# Patient Record
Sex: MALE | Race: WHITE | Employment: FULL TIME | URBAN - METROPOLITAN AREA
[De-identification: names, ages, dates, MRNs, and addresses within clinical notes are randomized per-mention and may not be internally consistent; named-entity substitution may affect disease eponyms.]

---

## 2017-04-24 ENCOUNTER — TELEPHONE (OUTPATIENT)
Dept: FAMILY MEDICINE CLINIC | Facility: CLINIC | Age: 37
End: 2017-04-24

## 2017-04-24 ENCOUNTER — HOSPITAL ENCOUNTER (OUTPATIENT)
Dept: GENERAL RADIOLOGY | Age: 37
Discharge: HOME | End: 2017-04-24
Attending: FAMILY MEDICINE
Payer: COMMERCIAL

## 2017-04-24 ENCOUNTER — OFFICE VISIT (OUTPATIENT)
Dept: FAMILY MEDICINE CLINIC | Facility: CLINIC | Age: 37
End: 2017-04-24

## 2017-04-24 VITALS
TEMPERATURE: 98 F | HEART RATE: 66 BPM | SYSTOLIC BLOOD PRESSURE: 114 MMHG | WEIGHT: 207 LBS | RESPIRATION RATE: 19 BRPM | DIASTOLIC BLOOD PRESSURE: 82 MMHG | HEIGHT: 69 IN | BODY MASS INDEX: 30.66 KG/M2

## 2017-04-24 DIAGNOSIS — M53.3 COCCYGEAL PAIN: ICD-10-CM

## 2017-04-24 DIAGNOSIS — M53.3 SACROCOCCYGEAL DISORDERS, NOT ELSEWHERE CLASSIFIED: Primary | ICD-10-CM

## 2017-04-24 DIAGNOSIS — S30.0XXD CONTUSION OF LOWER BACK AND PELVIS, SUBSEQUENT ENCOUNTER: ICD-10-CM

## 2017-04-24 DIAGNOSIS — S30.0XXA COCCYX CONTUSION, INITIAL ENCOUNTER: ICD-10-CM

## 2017-04-24 DIAGNOSIS — S30.0XXA COCCYX CONTUSION, INITIAL ENCOUNTER: Primary | ICD-10-CM

## 2017-04-24 PROCEDURE — 99214 OFFICE O/P EST MOD 30 MIN: CPT | Performed by: FAMILY MEDICINE

## 2017-04-24 PROCEDURE — 72220 X-RAY EXAM SACRUM TAILBONE: CPT

## 2017-04-24 RX ORDER — DICLOFENAC SODIUM 75 MG/1
75 TABLET, DELAYED RELEASE ORAL 2 TIMES DAILY
Qty: 30 TABLET | Refills: 0 | Status: SHIPPED | OUTPATIENT
Start: 2017-04-24 | End: 2017-10-30 | Stop reason: ALTCHOICE

## 2017-04-24 NOTE — PATIENT INSTRUCTIONS
Do x-ray of the coccyx and sacrum. Diclofenac 75 mg 1 tablet twice a day. Apply ice 15-20 minutes at a time 2  to 3 times per day for the next couple days then use heat as needed.   Rest.

## 2017-04-24 NOTE — PROGRESS NOTES
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 cc pain of the coccyx. HPI:   Patient is coming for evaluation of the pain in her his coccyx area. 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   He fell down on his coccyx area on the firm without murmur  GI: good BS's,no masses, HSM or tenderness  EXTREMITIES: no cyanosis, clubbing or edema  Musculoskeletal tenderness over the coccyx area, symmetric strength bilateral lower extremities, normal gait  Neurologic symmetric sensation bilateral Mike Ribera MD          The patient indicates understanding of these issues and agrees to the plan. The patient is asked to return in 2 weeks after X- ray.

## 2017-10-30 ENCOUNTER — OFFICE VISIT (OUTPATIENT)
Dept: FAMILY MEDICINE CLINIC | Facility: CLINIC | Age: 37
End: 2017-10-30

## 2017-10-30 VITALS
BODY MASS INDEX: 30.81 KG/M2 | SYSTOLIC BLOOD PRESSURE: 116 MMHG | TEMPERATURE: 98 F | WEIGHT: 208 LBS | HEART RATE: 67 BPM | DIASTOLIC BLOOD PRESSURE: 70 MMHG | HEIGHT: 69 IN | RESPIRATION RATE: 16 BRPM

## 2017-10-30 DIAGNOSIS — Z00.00 LABORATORY TESTS ORDERED AS PART OF A COMPLETE PHYSICAL EXAM (CPE): ICD-10-CM

## 2017-10-30 DIAGNOSIS — E55.9 VITAMIN D DEFICIENCY: ICD-10-CM

## 2017-10-30 DIAGNOSIS — H10.13 ALLERGIC CONJUNCTIVITIS OF BOTH EYES: Primary | ICD-10-CM

## 2017-10-30 DIAGNOSIS — R09.81 NASAL CONGESTION: ICD-10-CM

## 2017-10-30 DIAGNOSIS — J30.89 ACUTE NON-SEASONAL ALLERGIC RHINITIS, UNSPECIFIED TRIGGER: ICD-10-CM

## 2017-10-30 PROCEDURE — 99214 OFFICE O/P EST MOD 30 MIN: CPT | Performed by: FAMILY MEDICINE

## 2017-10-30 RX ORDER — FLUTICASONE PROPIONATE 50 MCG
2 SPRAY, SUSPENSION (ML) NASAL DAILY
Qty: 1 BOTTLE | Refills: 1 | Status: SHIPPED | OUTPATIENT
Start: 2017-10-30 | End: 2018-10-24 | Stop reason: ALTCHOICE

## 2017-10-30 NOTE — PROGRESS NOTES
Cyndy Alejandro is a 40year old male. cc itchy eyes, nasal congestion vitamin D deficiency. ,   HPI:   Patients come to the office complaining of having itchy eyes which started probably 3 months ago it is coming on and off.   They are watery itchy feels that BMI 30.72 kg/m²   GENERAL: well developed, well nourished,in no apparent distress  SKIN: no rashes,no suspicious lesions  HEENT: atraumatic, normocephalic,ears -minimal fluid behind tympanic members, irritation of the posterior throat, irritation of the na

## 2017-10-30 NOTE — PATIENT INSTRUCTIONS
Start Zyrtec 10 mg 1 tablet daily at bedtime. Fluticasone 2 sprays nostril once a day. Zaditor over-the-counter 1 drop twice a day both eyes.   You can check with her insurance if they cover blood allergy testing for allergic conjunctivitis diagnosis and

## 2018-10-24 ENCOUNTER — OFFICE VISIT (OUTPATIENT)
Dept: FAMILY MEDICINE CLINIC | Facility: CLINIC | Age: 38
End: 2018-10-24
Payer: COMMERCIAL

## 2018-10-24 ENCOUNTER — LAB ENCOUNTER (OUTPATIENT)
Dept: LAB | Age: 38
End: 2018-10-24
Attending: FAMILY MEDICINE
Payer: COMMERCIAL

## 2018-10-24 VITALS
DIASTOLIC BLOOD PRESSURE: 74 MMHG | BODY MASS INDEX: 30.07 KG/M2 | HEIGHT: 69 IN | SYSTOLIC BLOOD PRESSURE: 112 MMHG | TEMPERATURE: 98 F | WEIGHT: 203 LBS | HEART RATE: 89 BPM | RESPIRATION RATE: 18 BRPM

## 2018-10-24 DIAGNOSIS — R79.89 ELEVATED LIVER FUNCTION TESTS: ICD-10-CM

## 2018-10-24 DIAGNOSIS — R19.7 DIARRHEA OF PRESUMED INFECTIOUS ORIGIN: Primary | ICD-10-CM

## 2018-10-24 DIAGNOSIS — R10.9 ABDOMINAL DISCOMFORT: ICD-10-CM

## 2018-10-24 DIAGNOSIS — R19.7 DIARRHEA OF PRESUMED INFECTIOUS ORIGIN: ICD-10-CM

## 2018-10-24 PROCEDURE — 36415 COLL VENOUS BLD VENIPUNCTURE: CPT | Performed by: FAMILY MEDICINE

## 2018-10-24 PROCEDURE — 80053 COMPREHEN METABOLIC PANEL: CPT | Performed by: FAMILY MEDICINE

## 2018-10-24 PROCEDURE — 99214 OFFICE O/P EST MOD 30 MIN: CPT | Performed by: FAMILY MEDICINE

## 2018-10-24 PROCEDURE — 85025 COMPLETE CBC W/AUTO DIFF WBC: CPT | Performed by: FAMILY MEDICINE

## 2018-10-24 PROCEDURE — 83690 ASSAY OF LIPASE: CPT | Performed by: FAMILY MEDICINE

## 2018-10-24 NOTE — PATIENT INSTRUCTIONS
BRAT diet- banana, rice, apple sauce, toast.   Collect stool samples first.   Charcoal pills over the counter 2-3 times a day. Peptobismol twice a day. Getorade, sprite - drink plenty of fluids. Take Probiotic daily.

## 2018-10-24 NOTE — PROGRESS NOTES
Natasha Montgomery is a 45year old male. cc diarrhea/abdominal discomfort  HPI:   Pt is coming to the office with complaint of having diarrhea since October 15. Patient was in [facility] prior start of his symptoms.   He says that one time he had a fish which was well developed, well nourished,in no apparent distress  SKIN: no rashes,no suspicious lesions  HEENT: atraumatic, normocephalic,ears and throat are clear  NECK: supple,no adenopathy,  LUNGS: clear to auscultation  CARDIO: RRR without murmur  GI: good BS's,

## 2018-10-25 ENCOUNTER — LAB ENCOUNTER (OUTPATIENT)
Dept: LAB | Age: 38
End: 2018-10-25
Attending: FAMILY MEDICINE
Payer: COMMERCIAL

## 2018-10-25 DIAGNOSIS — R19.7 DIARRHEA OF PRESUMED INFECTIOUS ORIGIN: ICD-10-CM

## 2018-10-25 DIAGNOSIS — R79.89 ELEVATED LIVER FUNCTION TESTS: Primary | ICD-10-CM

## 2018-10-25 PROCEDURE — 87046 STOOL CULTR AEROBIC BACT EA: CPT | Performed by: FAMILY MEDICINE

## 2018-10-25 PROCEDURE — 87272 CRYPTOSPORIDIUM AG IF: CPT | Performed by: FAMILY MEDICINE

## 2018-10-25 PROCEDURE — 87493 C DIFF AMPLIFIED PROBE: CPT | Performed by: FAMILY MEDICINE

## 2018-10-25 PROCEDURE — 87177 OVA AND PARASITES SMEARS: CPT | Performed by: FAMILY MEDICINE

## 2018-10-25 PROCEDURE — 87427 SHIGA-LIKE TOXIN AG IA: CPT | Performed by: FAMILY MEDICINE

## 2018-10-25 PROCEDURE — 87209 SMEAR COMPLEX STAIN: CPT | Performed by: FAMILY MEDICINE

## 2018-10-25 PROCEDURE — 87329 GIARDIA AG IA: CPT | Performed by: FAMILY MEDICINE

## 2018-10-25 PROCEDURE — 87045 FECES CULTURE AEROBIC BACT: CPT | Performed by: FAMILY MEDICINE

## 2018-10-28 ENCOUNTER — TELEPHONE (OUTPATIENT)
Dept: FAMILY MEDICINE CLINIC | Facility: CLINIC | Age: 38
End: 2018-10-28

## 2018-10-28 NOTE — TELEPHONE ENCOUNTER
Patient had elevated liver function tests on the testing I wanted to add hepatitis panel and but I do not see the results please check with the lab is not done,  thank you

## 2018-10-29 NOTE — TELEPHONE ENCOUNTER
JULIA  I spoke with patient, advised to have additional lab done, voiced understanding, agreed with plan. He will have done tomorrow, per patient.   States he is feeling better no abdominal pain, no diarrhea o

## 2018-10-29 NOTE — TELEPHONE ENCOUNTER
I spoke with Gianni(Lab), they did not add the lab on 10/25/2018, and they are tunable to add lab now.

## 2018-10-31 ENCOUNTER — APPOINTMENT (OUTPATIENT)
Dept: LAB | Age: 38
End: 2018-10-31
Attending: FAMILY MEDICINE
Payer: COMMERCIAL

## 2018-10-31 DIAGNOSIS — R79.9 ABNORMAL BLOOD CHEMISTRY: ICD-10-CM

## 2018-10-31 DIAGNOSIS — Z13.89 SCREENING FOR GENITOURINARY CONDITION: ICD-10-CM

## 2018-10-31 DIAGNOSIS — R79.89 ELEVATED LIVER FUNCTION TESTS: ICD-10-CM

## 2018-10-31 DIAGNOSIS — Z00.00 LABORATORY EXAMINATION ORDERED AS PART OF A ROUTINE GENERAL MEDICAL EXAMINATION: ICD-10-CM

## 2018-10-31 DIAGNOSIS — Z00.00 LABORATORY EXAMINATION ORDERED AS PART OF A ROUTINE GENERAL MEDICAL EXAMINATION: Primary | ICD-10-CM

## 2018-10-31 PROCEDURE — 84443 ASSAY THYROID STIM HORMONE: CPT | Performed by: FAMILY MEDICINE

## 2018-10-31 PROCEDURE — 80074 ACUTE HEPATITIS PANEL: CPT | Performed by: FAMILY MEDICINE

## 2018-10-31 PROCEDURE — 36415 COLL VENOUS BLD VENIPUNCTURE: CPT | Performed by: FAMILY MEDICINE

## 2018-10-31 PROCEDURE — 81003 URINALYSIS AUTO W/O SCOPE: CPT | Performed by: FAMILY MEDICINE

## 2018-10-31 PROCEDURE — 80061 LIPID PANEL: CPT | Performed by: FAMILY MEDICINE

## 2018-10-31 PROCEDURE — 80053 COMPREHEN METABOLIC PANEL: CPT | Performed by: FAMILY MEDICINE

## 2019-02-08 ENCOUNTER — OFFICE VISIT (OUTPATIENT)
Dept: FAMILY MEDICINE CLINIC | Facility: CLINIC | Age: 39
End: 2019-02-08

## 2019-02-08 VITALS
HEIGHT: 69 IN | BODY MASS INDEX: 30.21 KG/M2 | DIASTOLIC BLOOD PRESSURE: 72 MMHG | HEART RATE: 56 BPM | SYSTOLIC BLOOD PRESSURE: 108 MMHG | WEIGHT: 204 LBS | RESPIRATION RATE: 16 BRPM | TEMPERATURE: 97 F

## 2019-02-08 DIAGNOSIS — Z00.00 PHYSICAL EXAM, ANNUAL: Primary | ICD-10-CM

## 2019-02-08 DIAGNOSIS — E55.9 VITAMIN D DEFICIENCY: ICD-10-CM

## 2019-02-08 DIAGNOSIS — E78.2 HYPERLIPIDEMIA, MIXED: ICD-10-CM

## 2019-02-08 PROCEDURE — 99395 PREV VISIT EST AGE 18-39: CPT | Performed by: FAMILY MEDICINE

## 2019-02-09 NOTE — PATIENT INSTRUCTIONS
Healthy diet. Stay active. Fasting blood test in April 2019 we will call you with results when those are back.

## 2019-02-09 NOTE — PROGRESS NOTES
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   HPI:   Pt no complaints. Cholesterol is borderline elevated at 1 of the liver test came back borderline. Patient will recheck blood work in the spring. Has new baby.   Kvng congestion, sinus pain or ST  LUNGS: denies shortness of breath with exertion  CARDIOVASCULAR: denies chest pain on exertion  GI: denies abdominal pain,denies heartburn  : denies nocturia or changes in stream  MUSCULOSKELETAL: denies back pain  NEURO: de diet and aerobic exercise 30 minutes three times weekly. Health maintenance, will check fasting Lipids, CMP, CBC . Pt will be at 48 referred for screening colonoscopy. The patient indicates understanding of these issues and agrees to the plan.   The patient

## 2020-01-02 ENCOUNTER — HOSPITAL ENCOUNTER (EMERGENCY)
Age: 40
Discharge: HOME | End: 2020-01-02
Attending: EMERGENCY MEDICINE
Payer: COMMERCIAL

## 2020-01-02 ENCOUNTER — TELEPHONE (OUTPATIENT)
Dept: FAMILY MEDICINE CLINIC | Facility: CLINIC | Age: 40
End: 2020-01-02

## 2020-01-02 ENCOUNTER — APPOINTMENT (OUTPATIENT)
Dept: GENERAL RADIOLOGY | Age: 40
End: 2020-01-02
Attending: EMERGENCY MEDICINE
Payer: COMMERCIAL

## 2020-01-02 VITALS
BODY MASS INDEX: 30.62 KG/M2 | SYSTOLIC BLOOD PRESSURE: 132 MMHG | DIASTOLIC BLOOD PRESSURE: 62 MMHG | HEART RATE: 70 BPM | OXYGEN SATURATION: 99 % | RESPIRATION RATE: 16 BRPM | WEIGHT: 202 LBS | TEMPERATURE: 98 F | HEIGHT: 68 IN

## 2020-01-02 DIAGNOSIS — R07.89 CHEST PAIN, ATYPICAL: Primary | ICD-10-CM

## 2020-01-02 LAB
ALBUMIN SERPL-MCNC: 4 G/DL (ref 3.4–5)
ALBUMIN/GLOB SERPL: 1 {RATIO} (ref 1–2)
ALP LIVER SERPL-CCNC: 84 U/L (ref 45–117)
ALT SERPL-CCNC: 55 U/L (ref 16–61)
ANION GAP SERPL CALC-SCNC: 7 MMOL/L (ref 0–18)
AST SERPL-CCNC: 23 U/L (ref 15–37)
BASOPHILS # BLD AUTO: 0.03 X10(3) UL (ref 0–0.2)
BASOPHILS NFR BLD AUTO: 0.4 %
BILIRUB SERPL-MCNC: 0.2 MG/DL (ref 0.1–2)
BUN BLD-MCNC: 24 MG/DL (ref 7–18)
BUN/CREAT SERPL: 21.2 (ref 10–20)
CALCIUM BLD-MCNC: 8.6 MG/DL (ref 8.5–10.1)
CHLORIDE SERPL-SCNC: 110 MMOL/L (ref 98–112)
CO2 SERPL-SCNC: 24 MMOL/L (ref 21–32)
CREAT BLD-MCNC: 1.13 MG/DL (ref 0.7–1.3)
DEPRECATED RDW RBC AUTO: 39.8 FL (ref 35.1–46.3)
EOSINOPHIL # BLD AUTO: 0.28 X10(3) UL (ref 0–0.7)
EOSINOPHIL NFR BLD AUTO: 3.4 %
ERYTHROCYTE [DISTWIDTH] IN BLOOD BY AUTOMATED COUNT: 12.9 % (ref 11–15)
GLOBULIN PLAS-MCNC: 3.9 G/DL (ref 2.8–4.4)
GLUCOSE BLD-MCNC: 107 MG/DL (ref 70–99)
HCT VFR BLD AUTO: 43.1 % (ref 39–53)
HGB BLD-MCNC: 14.6 G/DL (ref 13–17.5)
IMM GRANULOCYTES # BLD AUTO: 0.02 X10(3) UL (ref 0–1)
IMM GRANULOCYTES NFR BLD: 0.2 %
LYMPHOCYTES # BLD AUTO: 3.56 X10(3) UL (ref 1–4)
LYMPHOCYTES NFR BLD AUTO: 43 %
M PROTEIN MFR SERPL ELPH: 7.9 G/DL (ref 6.4–8.2)
MCH RBC QN AUTO: 28.5 PG (ref 26–34)
MCHC RBC AUTO-ENTMCNC: 33.9 G/DL (ref 31–37)
MCV RBC AUTO: 84.2 FL (ref 80–100)
MONOCYTES # BLD AUTO: 0.69 X10(3) UL (ref 0.1–1)
MONOCYTES NFR BLD AUTO: 8.3 %
NEUTROPHILS # BLD AUTO: 3.7 X10 (3) UL (ref 1.5–7.7)
NEUTROPHILS # BLD AUTO: 3.7 X10(3) UL (ref 1.5–7.7)
NEUTROPHILS NFR BLD AUTO: 44.7 %
OSMOLALITY SERPL CALC.SUM OF ELEC: 297 MOSM/KG (ref 275–295)
PLATELET # BLD AUTO: 259 10(3)UL (ref 150–450)
POTASSIUM SERPL-SCNC: 4 MMOL/L (ref 3.5–5.1)
RBC # BLD AUTO: 5.12 X10(6)UL (ref 4.3–5.7)
SODIUM SERPL-SCNC: 141 MMOL/L (ref 136–145)
TROPONIN I SERPL-MCNC: <0.045 NG/ML (ref ?–0.04)
WBC # BLD AUTO: 8.3 X10(3) UL (ref 4–11)

## 2020-01-02 PROCEDURE — 84484 ASSAY OF TROPONIN QUANT: CPT | Performed by: EMERGENCY MEDICINE

## 2020-01-02 PROCEDURE — 36415 COLL VENOUS BLD VENIPUNCTURE: CPT

## 2020-01-02 PROCEDURE — 93005 ELECTROCARDIOGRAM TRACING: CPT

## 2020-01-02 PROCEDURE — 99284 EMERGENCY DEPT VISIT MOD MDM: CPT

## 2020-01-02 PROCEDURE — 85025 COMPLETE CBC W/AUTO DIFF WBC: CPT | Performed by: EMERGENCY MEDICINE

## 2020-01-02 PROCEDURE — 99285 EMERGENCY DEPT VISIT HI MDM: CPT

## 2020-01-02 PROCEDURE — 93010 ELECTROCARDIOGRAM REPORT: CPT

## 2020-01-02 PROCEDURE — 71045 X-RAY EXAM CHEST 1 VIEW: CPT | Performed by: EMERGENCY MEDICINE

## 2020-01-02 PROCEDURE — 80053 COMPREHEN METABOLIC PANEL: CPT | Performed by: EMERGENCY MEDICINE

## 2020-01-02 NOTE — TELEPHONE ENCOUNTER
Pt called back requesting a CPE for PCP next week, \"I want to talk to my doctor\" pt transferred to triage and states he did not go to ER, that he does not have chest pain now and wants an appt next week with Dr Justino Osorio. pt states if he has chest pain

## 2020-01-02 NOTE — TELEPHONE ENCOUNTER
My recommendation is for patient to be evaluated in the emergency room if he is having intermittent chest pain. He should understand that this is a serious symptom and should be evaluated ASAP.   We do not have capabilities of running the proper blood test

## 2020-01-02 NOTE — TELEPHONE ENCOUNTER
1. What are your symptoms? Left sided chest pain      2. How long have you been having these symptoms? Had it once while driving      3. Have you done anything already to treat your symptoms?    Nothing      ADDITIONAL INFO:     Transferred live to triage

## 2020-01-02 NOTE — TELEPHONE ENCOUNTER
Strongly encourage pt to go to ER for his symptoms. Explaining the importance of being evaluated. Voices understanding.

## 2020-01-03 ENCOUNTER — TELEPHONE (OUTPATIENT)
Dept: FAMILY MEDICINE CLINIC | Facility: CLINIC | Age: 40
End: 2020-01-03

## 2020-01-03 LAB
ATRIAL RATE: 67 BPM
P AXIS: 25 DEGREES
P-R INTERVAL: 204 MS
Q-T INTERVAL: 380 MS
QRS DURATION: 98 MS
QTC CALCULATION (BEZET): 401 MS
R AXIS: -20 DEGREES
T AXIS: 7 DEGREES
VENTRICULAR RATE: 67 BPM

## 2020-01-03 NOTE — ED PROVIDER NOTES
Patient Seen in: [facility] In [place]      History   Patient presents with:  Chest Pain Angina    Stated Complaint: chest pain, left arm numbness    HPI    27-year-old male comes to the hospital complaint of having difficulty with ch JVD, trachea midline, No LAD  Heart: S1S2 normal. No murmurs, regular rate and rhythm  Lungs: Clear to auscultation bilaterally  Abdomen: Soft nontender nondistended normal active bowel sounds without rebound, guarding or masses noted  Back nontender witho normal in size. The lungs are clear of acute-appearing disease process. The costophrenic angles are sharp. There is no active disease seen on the basis of portable chest radiography. CONCLUSION:  No active disease seen.     Dictated by: Argentina Lipscomb

## 2020-01-15 ENCOUNTER — OFFICE VISIT (OUTPATIENT)
Dept: FAMILY MEDICINE CLINIC | Facility: CLINIC | Age: 40
End: 2020-01-15
Payer: COMMERCIAL

## 2020-01-15 VITALS
TEMPERATURE: 97 F | HEIGHT: 68 IN | HEART RATE: 68 BPM | SYSTOLIC BLOOD PRESSURE: 120 MMHG | BODY MASS INDEX: 31.64 KG/M2 | WEIGHT: 208.81 LBS | DIASTOLIC BLOOD PRESSURE: 70 MMHG

## 2020-01-15 DIAGNOSIS — Z87.891 FORMER SMOKER: ICD-10-CM

## 2020-01-15 DIAGNOSIS — R07.9 CHEST PAIN, UNSPECIFIED TYPE: Primary | ICD-10-CM

## 2020-01-15 DIAGNOSIS — R94.31 ABNORMAL FINDING ON EKG: ICD-10-CM

## 2020-01-15 DIAGNOSIS — E78.00 HYPERCHOLESTEREMIA: ICD-10-CM

## 2020-01-15 DIAGNOSIS — Z00.00 LABORATORY TESTS ORDERED AS PART OF A COMPLETE PHYSICAL EXAM (CPE): ICD-10-CM

## 2020-01-15 PROCEDURE — 99214 OFFICE O/P EST MOD 30 MIN: CPT | Performed by: FAMILY MEDICINE

## 2020-01-15 NOTE — PATIENT INSTRUCTIONS
Call [phone] to schedule  echo of the heart. Healthy diet. Keep good hydration. Schedule complete physical and do fasting blood work 1 week prior your physical visit.

## 2020-01-16 NOTE — PROGRESS NOTES
Sidney Fowler is a 44year old male. cc chest pain, elevated cholesterol in the past  HPI:   Patient is coming to the office for follow-up from emergency room visit from January 2.   Patient noted to have episodes of chest pain on the left side of the chest 120/70   Pulse 68   Temp 96.9 °F (36.1 °C) (Oral)   Ht 68\"   Wt 208 lb 12.8 oz (94.7 kg)   BMI 31.75 kg/m²   GENERAL: well developed, well nourished,in no apparent distress  SKIN: no rashes,no suspicious lesions  HEENT: atraumatic, normocephalic,ears and

## 2020-03-07 ENCOUNTER — LAB ENCOUNTER (OUTPATIENT)
Dept: LAB | Facility: HOSPITAL | Age: 40
End: 2020-03-07
Attending: FAMILY MEDICINE
Payer: COMMERCIAL

## 2020-03-07 DIAGNOSIS — Z00.00 LABORATORY TESTS ORDERED AS PART OF A COMPLETE PHYSICAL EXAM (CPE): ICD-10-CM

## 2020-03-07 DIAGNOSIS — E78.00 HYPERCHOLESTEREMIA: Primary | ICD-10-CM

## 2020-03-07 LAB
ALBUMIN SERPL-MCNC: 4 G/DL (ref 3.4–5)
ALBUMIN/GLOB SERPL: 1 {RATIO} (ref 1–2)
ALP LIVER SERPL-CCNC: 59 U/L (ref 45–117)
ALT SERPL-CCNC: 48 U/L (ref 16–61)
ANION GAP SERPL CALC-SCNC: 4 MMOL/L (ref 0–18)
AST SERPL-CCNC: 22 U/L (ref 15–37)
BASOPHILS # BLD AUTO: 0.03 X10(3) UL (ref 0–0.2)
BASOPHILS NFR BLD AUTO: 0.5 %
BILIRUB SERPL-MCNC: 0.5 MG/DL (ref 0.1–2)
BILIRUB UR QL STRIP.AUTO: NEGATIVE
BUN BLD-MCNC: 20 MG/DL (ref 7–18)
BUN/CREAT SERPL: 22.2 (ref 10–20)
CALCIUM BLD-MCNC: 9 MG/DL (ref 8.5–10.1)
CHLORIDE SERPL-SCNC: 108 MMOL/L (ref 98–112)
CHOLEST SMN-MCNC: 227 MG/DL (ref ?–200)
CLARITY UR REFRACT.AUTO: CLEAR
CO2 SERPL-SCNC: 28 MMOL/L (ref 21–32)
COLOR UR AUTO: YELLOW
CREAT BLD-MCNC: 0.9 MG/DL (ref 0.7–1.3)
DEPRECATED RDW RBC AUTO: 40.7 FL (ref 35.1–46.3)
EOSINOPHIL # BLD AUTO: 0.27 X10(3) UL (ref 0–0.7)
EOSINOPHIL NFR BLD AUTO: 4.1 %
ERYTHROCYTE [DISTWIDTH] IN BLOOD BY AUTOMATED COUNT: 12.7 % (ref 11–15)
GLOBULIN PLAS-MCNC: 3.9 G/DL (ref 2.8–4.4)
GLUCOSE BLD-MCNC: 97 MG/DL (ref 70–99)
GLUCOSE UR STRIP.AUTO-MCNC: NEGATIVE MG/DL
HCT VFR BLD AUTO: 46.4 % (ref 39–53)
HDLC SERPL-MCNC: 38 MG/DL (ref 40–59)
HGB BLD-MCNC: 15 G/DL (ref 13–17.5)
IMM GRANULOCYTES # BLD AUTO: 0.02 X10(3) UL (ref 0–1)
IMM GRANULOCYTES NFR BLD: 0.3 %
KETONES UR STRIP.AUTO-MCNC: NEGATIVE MG/DL
LDLC SERPL CALC-MCNC: 154 MG/DL (ref ?–100)
LEUKOCYTE ESTERASE UR QL STRIP.AUTO: NEGATIVE
LYMPHOCYTES # BLD AUTO: 2.83 X10(3) UL (ref 1–4)
LYMPHOCYTES NFR BLD AUTO: 42.7 %
M PROTEIN MFR SERPL ELPH: 7.9 G/DL (ref 6.4–8.2)
MCH RBC QN AUTO: 28.2 PG (ref 26–34)
MCHC RBC AUTO-ENTMCNC: 32.3 G/DL (ref 31–37)
MCV RBC AUTO: 87.4 FL (ref 80–100)
MONOCYTES # BLD AUTO: 0.56 X10(3) UL (ref 0.1–1)
MONOCYTES NFR BLD AUTO: 8.4 %
NEUTROPHILS # BLD AUTO: 2.92 X10 (3) UL (ref 1.5–7.7)
NEUTROPHILS # BLD AUTO: 2.92 X10(3) UL (ref 1.5–7.7)
NEUTROPHILS NFR BLD AUTO: 44 %
NITRITE UR QL STRIP.AUTO: NEGATIVE
NONHDLC SERPL-MCNC: 189 MG/DL (ref ?–130)
OSMOLALITY SERPL CALC.SUM OF ELEC: 293 MOSM/KG (ref 275–295)
PATIENT FASTING Y/N/NP: YES
PATIENT FASTING Y/N/NP: YES
PH UR STRIP.AUTO: 6 [PH] (ref 4.5–8)
PLATELET # BLD AUTO: 232 10(3)UL (ref 150–450)
POTASSIUM SERPL-SCNC: 4.2 MMOL/L (ref 3.5–5.1)
PROT UR STRIP.AUTO-MCNC: NEGATIVE MG/DL
RBC # BLD AUTO: 5.31 X10(6)UL (ref 4.3–5.7)
RBC UR QL AUTO: NEGATIVE
SODIUM SERPL-SCNC: 140 MMOL/L (ref 136–145)
SP GR UR STRIP.AUTO: 1.02 (ref 1–1.03)
TRIGL SERPL-MCNC: 177 MG/DL (ref 30–149)
TSI SER-ACNC: 1.42 MIU/ML (ref 0.36–3.74)
UROBILINOGEN UR STRIP.AUTO-MCNC: <2 MG/DL
VLDLC SERPL CALC-MCNC: 35 MG/DL (ref 0–30)
WBC # BLD AUTO: 6.6 X10(3) UL (ref 4–11)

## 2020-03-07 PROCEDURE — 36415 COLL VENOUS BLD VENIPUNCTURE: CPT

## 2020-03-07 PROCEDURE — 80053 COMPREHEN METABOLIC PANEL: CPT

## 2020-03-07 PROCEDURE — 80061 LIPID PANEL: CPT

## 2020-03-07 PROCEDURE — 85025 COMPLETE CBC W/AUTO DIFF WBC: CPT

## 2020-03-07 PROCEDURE — 84443 ASSAY THYROID STIM HORMONE: CPT

## 2020-03-07 PROCEDURE — 81003 URINALYSIS AUTO W/O SCOPE: CPT

## 2020-05-18 ENCOUNTER — TELEPHONE (OUTPATIENT)
Dept: FAMILY MEDICINE CLINIC | Facility: CLINIC | Age: 40
End: 2020-05-18

## 2021-05-21 ENCOUNTER — TELEPHONE (OUTPATIENT)
Dept: FAMILY MEDICINE CLINIC | Facility: CLINIC | Age: 41
End: 2021-05-21

## 2021-05-21 ENCOUNTER — HOSPITAL ENCOUNTER (OUTPATIENT)
Age: 41
Discharge: HOME | End: 2021-05-21
Payer: COMMERCIAL

## 2021-05-21 VITALS
HEART RATE: 63 BPM | TEMPERATURE: 98 F | OXYGEN SATURATION: 95 % | DIASTOLIC BLOOD PRESSURE: 71 MMHG | RESPIRATION RATE: 16 BRPM | SYSTOLIC BLOOD PRESSURE: 119 MMHG

## 2021-05-21 DIAGNOSIS — S05.02XA ABRASION OF LEFT CORNEA, INITIAL ENCOUNTER: Primary | ICD-10-CM

## 2021-05-21 PROCEDURE — 99213 OFFICE O/P EST LOW 20 MIN: CPT

## 2021-05-21 RX ORDER — OFLOXACIN 3 MG/ML
1 SOLUTION/ DROPS OPHTHALMIC 4 TIMES DAILY
Qty: 1 BOTTLE | Refills: 0 | Status: SHIPPED | OUTPATIENT
Start: 2021-05-21 | End: 2021-05-31

## 2021-05-21 RX ORDER — TETRACAINE HYDROCHLORIDE 5 MG/ML
1 SOLUTION OPHTHALMIC ONCE
Status: COMPLETED | OUTPATIENT
Start: 2021-05-21 | End: 2021-05-21

## 2021-05-21 NOTE — ED PROVIDER NOTES
Patient Seen in: [facility]      History   Patient presents with:  Eye Problem    Stated Complaint: eye irritation    HPI/Subjective:   HPI    35 yo male here with complaint of redness and discomfort to the left eye in tandem  with foreig Normocephalic.       Right Ear: External ear normal.      Left Ear: External ear normal.      Nose: Nose normal.      Mouth/Throat:      Mouth: Mucous membranes are moist.   Eyes:      General: Lids are normal.      Extraocular Movements: Extraocular moveme discharge today. Previous conversations with PCP and charts were reviewed.                                 Disposition and Plan     Clinical Impression:  Abrasion of left cornea, initial encounter  (primary encounter diagnosis)     Disposition:  Discharge

## 2021-05-21 NOTE — TELEPHONE ENCOUNTER
Patient left eye is swollen and he cant see out of it. Patient said there was a bug his eye yesterday  and his wife removed it. Its now really swollen and he feels like there is something in it. Please call wife  back.

## 2021-05-21 NOTE — ED INITIAL ASSESSMENT (HPI)
Patient presents to IC with c/o left eye irritation x 2 days. Stoney Conteh flew out of nest yesterday and some pami flew into it. NO contact lens. Tdap up to date.

## 2022-05-18 ENCOUNTER — OFFICE VISIT (OUTPATIENT)
Dept: FAMILY MEDICINE CLINIC | Facility: CLINIC | Age: 42
End: 2022-05-18
Payer: COMMERCIAL

## 2022-05-18 VITALS
HEART RATE: 70 BPM | HEIGHT: 68 IN | BODY MASS INDEX: 31.98 KG/M2 | TEMPERATURE: 97 F | DIASTOLIC BLOOD PRESSURE: 74 MMHG | SYSTOLIC BLOOD PRESSURE: 102 MMHG | WEIGHT: 211 LBS | RESPIRATION RATE: 14 BRPM

## 2022-05-18 DIAGNOSIS — Z12.5 SCREENING FOR MALIGNANT NEOPLASM OF PROSTATE: ICD-10-CM

## 2022-05-18 DIAGNOSIS — Z13.89 SCREENING FOR GENITOURINARY CONDITION: ICD-10-CM

## 2022-05-18 DIAGNOSIS — Z00.00 LABORATORY EXAMINATION ORDERED AS PART OF A ROUTINE GENERAL MEDICAL EXAMINATION: ICD-10-CM

## 2022-05-18 DIAGNOSIS — Z00.00 PHYSICAL EXAM, ANNUAL: Primary | ICD-10-CM

## 2022-05-18 PROCEDURE — 3078F DIAST BP <80 MM HG: CPT | Performed by: FAMILY MEDICINE

## 2022-05-18 PROCEDURE — 3074F SYST BP LT 130 MM HG: CPT | Performed by: FAMILY MEDICINE

## 2022-05-18 PROCEDURE — 3008F BODY MASS INDEX DOCD: CPT | Performed by: FAMILY MEDICINE

## 2022-05-18 PROCEDURE — 99396 PREV VISIT EST AGE 40-64: CPT | Performed by: FAMILY MEDICINE

## 2023-01-18 ENCOUNTER — HOSPITAL ENCOUNTER (OUTPATIENT)
Age: 43
Discharge: HOME | End: 2023-01-18
Payer: COMMERCIAL

## 2023-01-18 VITALS
OXYGEN SATURATION: 97 % | RESPIRATION RATE: 16 BRPM | DIASTOLIC BLOOD PRESSURE: 85 MMHG | HEIGHT: 66 IN | BODY MASS INDEX: 33.75 KG/M2 | HEART RATE: 66 BPM | TEMPERATURE: 98 F | SYSTOLIC BLOOD PRESSURE: 132 MMHG | WEIGHT: 210 LBS

## 2023-01-18 DIAGNOSIS — R07.89 CHEST PAIN, ATYPICAL: Primary | ICD-10-CM

## 2023-01-18 DIAGNOSIS — H10.31 ACUTE CONJUNCTIVITIS OF RIGHT EYE, UNSPECIFIED ACUTE CONJUNCTIVITIS TYPE: ICD-10-CM

## 2023-01-18 PROCEDURE — 93005 ELECTROCARDIOGRAM TRACING: CPT

## 2023-01-18 PROCEDURE — 93010 ELECTROCARDIOGRAM REPORT: CPT

## 2023-01-18 PROCEDURE — 99214 OFFICE O/P EST MOD 30 MIN: CPT

## 2023-01-18 RX ORDER — POLYMYXIN B SULFATE AND TRIMETHOPRIM 1; 10000 MG/ML; [USP'U]/ML
1 SOLUTION OPHTHALMIC
Qty: 10 ML | Refills: 0 | Status: SHIPPED | OUTPATIENT
Start: 2023-01-18 | End: 2023-01-23 | Stop reason: ALTCHOICE

## 2023-01-19 ENCOUNTER — TELEPHONE (OUTPATIENT)
Dept: FAMILY MEDICINE CLINIC | Facility: CLINIC | Age: 43
End: 2023-01-19

## 2023-01-19 LAB
ATRIAL RATE: 71 BPM
P AXIS: 23 DEGREES
P-R INTERVAL: 206 MS
Q-T INTERVAL: 376 MS
QRS DURATION: 98 MS
QTC CALCULATION (BEZET): 408 MS
R AXIS: -17 DEGREES
T AXIS: 7 DEGREES
VENTRICULAR RATE: 71 BPM

## 2023-01-19 NOTE — TELEPHONE ENCOUNTER
FYI Only:    S/w Chris who denied need for "[employer]. Hx of sx: Pt was seen at [address] [place] [address] for intermittent left chest pain 1/17/23 and blood in eye. Pt denied known cardiac history. Sx: Pt stated 5/10 left side of chest and has not worsening pain since seen in . Pt denied shortness of breath or dizziness. Pt declined to schedule with [facility] since wanted to see Dr. Eric Reina first. Pt had frequent episodes of chest pain over the past few days. Consulted with Baby Emperor MARTINEZ on call who strongly advised to call [facility] to schedule an appointment as directed. Informed the pt to call tomorrow [facility] to schedule an appointment and not to do so would be against medical advise and could result in negative consequences regarding his health. Pt verbalized understanding and agreed to call. Emphasized to the pt if has increase in chest pain or develop shortness of breath, or dizziness proceed to the ED. Pt voiced understanding.

## 2023-01-19 NOTE — TELEPHONE ENCOUNTER
Gabino Callahan  # [id number]    Outbound call via [address] general message left on unidentified voice mail to call Dr. Fernando Bran office.

## 2023-01-19 NOTE — TELEPHONE ENCOUNTER
1. What are your symptoms? Pt calling stating that he was recently seen in the urgent care for chest pain, and blood in his eye. 2. How long have you been having these symptoms? Lasting for two days       3. Have you done anything already to treat your symptoms? Pt was seen in urgent care       ADDITIONAL INFO:   Pt would like to be seen for a follow up on this matter sooner than later. Pt requesting call back for this matter.

## 2023-01-20 ENCOUNTER — TELEPHONE (OUTPATIENT)
Dept: FAMILY MEDICINE CLINIC | Facility: CLINIC | Age: 43
End: 2023-01-20

## 2023-01-20 NOTE — TELEPHONE ENCOUNTER
Pt stated \" the chest pain is better the past few days. \" he is seeing cardiology but not until 02/02/2023. I offered an appt for pt to see Dr. Katy Doyle on Monday at 11:00 am. I instructed pt to not cancel the appointment on 02/02/2023 w/ cardiology. I also instructed him if he starts to have chest pain again, shortness of breath, dizziness, left arm pain, sweating , neck/ jaw pain to go to the ER immediately.  Pt. Agreed to plan and verbalized understanding

## 2023-01-20 NOTE — TELEPHONE ENCOUNTER
Patient has appointment scheduled Monday, January 23. Ivonne DUNN spoke to the patient. If symptoms would worsen recommended to go to the ER over the weekend.

## 2023-01-23 ENCOUNTER — OFFICE VISIT (OUTPATIENT)
Dept: FAMILY MEDICINE CLINIC | Facility: CLINIC | Age: 43
End: 2023-01-23
Payer: COMMERCIAL

## 2023-01-23 VITALS
TEMPERATURE: 98 F | HEART RATE: 80 BPM | WEIGHT: 215 LBS | HEIGHT: 66 IN | BODY MASS INDEX: 34.55 KG/M2 | DIASTOLIC BLOOD PRESSURE: 80 MMHG | SYSTOLIC BLOOD PRESSURE: 112 MMHG | RESPIRATION RATE: 18 BRPM

## 2023-01-23 DIAGNOSIS — E66.09 CLASS 1 OBESITY DUE TO EXCESS CALORIES WITHOUT SERIOUS COMORBIDITY WITH BODY MASS INDEX (BMI) OF 34.0 TO 34.9 IN ADULT: ICD-10-CM

## 2023-01-23 DIAGNOSIS — I51.7 LVH (LEFT VENTRICULAR HYPERTROPHY): ICD-10-CM

## 2023-01-23 DIAGNOSIS — E78.00 HYPERCHOLESTEREMIA: ICD-10-CM

## 2023-01-23 DIAGNOSIS — R07.2 CHEST PAIN, PRECORDIAL: Primary | ICD-10-CM

## 2023-01-23 PROCEDURE — 3008F BODY MASS INDEX DOCD: CPT | Performed by: FAMILY MEDICINE

## 2023-01-23 PROCEDURE — 99214 OFFICE O/P EST MOD 30 MIN: CPT | Performed by: FAMILY MEDICINE

## 2023-01-23 PROCEDURE — 3074F SYST BP LT 130 MM HG: CPT | Performed by: FAMILY MEDICINE

## 2023-01-23 PROCEDURE — 3079F DIAST BP 80-89 MM HG: CPT | Performed by: FAMILY MEDICINE

## 2023-01-23 NOTE — PATIENT INSTRUCTIONS
Call [phone] to schedule stress echo. Do fasting blood test.  Healthy diet.   See cardiologist.,    .

## 2023-01-25 ENCOUNTER — LAB ENCOUNTER (OUTPATIENT)
Dept: LAB | Age: 43
End: 2023-01-25
Attending: FAMILY MEDICINE
Payer: COMMERCIAL

## 2023-01-25 DIAGNOSIS — Z13.89 SCREENING FOR GENITOURINARY CONDITION: ICD-10-CM

## 2023-01-25 DIAGNOSIS — Z00.00 PHYSICAL EXAM, ANNUAL: ICD-10-CM

## 2023-01-25 DIAGNOSIS — Z12.5 SCREENING FOR MALIGNANT NEOPLASM OF PROSTATE: ICD-10-CM

## 2023-01-25 DIAGNOSIS — R73.9 HYPERGLYCEMIA: ICD-10-CM

## 2023-01-25 DIAGNOSIS — R73.9 HYPERGLYCEMIA: Primary | ICD-10-CM

## 2023-01-25 LAB
ALBUMIN SERPL-MCNC: 4 G/DL (ref 3.4–5)
ALBUMIN/GLOB SERPL: 1 {RATIO} (ref 1–2)
ALP LIVER SERPL-CCNC: 61 U/L
ALT SERPL-CCNC: 48 U/L
ANION GAP SERPL CALC-SCNC: 5 MMOL/L (ref 0–18)
AST SERPL-CCNC: 24 U/L (ref 15–37)
BASOPHILS # BLD AUTO: 0.03 X10(3) UL (ref 0–0.2)
BASOPHILS NFR BLD AUTO: 0.4 %
BILIRUB SERPL-MCNC: 0.5 MG/DL (ref 0.1–2)
BILIRUB UR QL STRIP.AUTO: NEGATIVE
BUN BLD-MCNC: 22 MG/DL (ref 7–18)
CALCIUM BLD-MCNC: 9.4 MG/DL (ref 8.5–10.1)
CHLORIDE SERPL-SCNC: 109 MMOL/L (ref 98–112)
CHOLEST SERPL-MCNC: 293 MG/DL (ref ?–200)
CLARITY UR REFRACT.AUTO: CLEAR
CO2 SERPL-SCNC: 26 MMOL/L (ref 21–32)
COLOR UR AUTO: YELLOW
COMPLEXED PSA SERPL-MCNC: 0.56 NG/ML (ref ?–4)
CREAT BLD-MCNC: 0.9 MG/DL
EOSINOPHIL # BLD AUTO: 0.37 X10(3) UL (ref 0–0.7)
EOSINOPHIL NFR BLD AUTO: 4.8 %
ERYTHROCYTE [DISTWIDTH] IN BLOOD BY AUTOMATED COUNT: 13.2 %
EST. AVERAGE GLUCOSE BLD GHB EST-MCNC: 120 MG/DL (ref 68–126)
FASTING PATIENT LIPID ANSWER: YES
FASTING STATUS PATIENT QL REPORTED: YES
GFR SERPLBLD BASED ON 1.73 SQ M-ARVRAT: 109 ML/MIN/1.73M2 (ref 60–?)
GLOBULIN PLAS-MCNC: 3.9 G/DL (ref 2.8–4.4)
GLUCOSE BLD-MCNC: 108 MG/DL (ref 70–99)
GLUCOSE UR STRIP.AUTO-MCNC: NEGATIVE MG/DL
HBA1C MFR BLD: 5.8 % (ref ?–5.7)
HCT VFR BLD AUTO: 46.1 %
HDLC SERPL-MCNC: 40 MG/DL (ref 40–59)
HGB BLD-MCNC: 15.3 G/DL
IMM GRANULOCYTES # BLD AUTO: 0.03 X10(3) UL (ref 0–1)
IMM GRANULOCYTES NFR BLD: 0.4 %
KETONES UR STRIP.AUTO-MCNC: NEGATIVE MG/DL
LDLC SERPL CALC-MCNC: 201 MG/DL (ref ?–100)
LEUKOCYTE ESTERASE UR QL STRIP.AUTO: NEGATIVE
LYMPHOCYTES # BLD AUTO: 2.79 X10(3) UL (ref 1–4)
LYMPHOCYTES NFR BLD AUTO: 36.2 %
MCH RBC QN AUTO: 28.3 PG (ref 26–34)
MCHC RBC AUTO-ENTMCNC: 33.2 G/DL (ref 31–37)
MCV RBC AUTO: 85.2 FL
MONOCYTES # BLD AUTO: 0.67 X10(3) UL (ref 0.1–1)
MONOCYTES NFR BLD AUTO: 8.7 %
NEUTROPHILS # BLD AUTO: 3.82 X10 (3) UL (ref 1.5–7.7)
NEUTROPHILS # BLD AUTO: 3.82 X10(3) UL (ref 1.5–7.7)
NEUTROPHILS NFR BLD AUTO: 49.5 %
NITRITE UR QL STRIP.AUTO: NEGATIVE
NONHDLC SERPL-MCNC: 253 MG/DL (ref ?–130)
OSMOLALITY SERPL CALC.SUM OF ELEC: 294 MOSM/KG (ref 275–295)
PH UR STRIP.AUTO: 5.5 [PH] (ref 5–8)
PLATELET # BLD AUTO: 270 10(3)UL (ref 150–450)
POTASSIUM SERPL-SCNC: 3.9 MMOL/L (ref 3.5–5.1)
PROT SERPL-MCNC: 7.9 G/DL (ref 6.4–8.2)
PROT UR STRIP.AUTO-MCNC: NEGATIVE MG/DL
RBC # BLD AUTO: 5.41 X10(6)UL
RBC UR QL AUTO: NEGATIVE
SODIUM SERPL-SCNC: 140 MMOL/L (ref 136–145)
SP GR UR STRIP.AUTO: 1.02 (ref 1–1.03)
TRIGL SERPL-MCNC: 264 MG/DL (ref 30–149)
TSI SER-ACNC: 1.22 MIU/ML (ref 0.36–3.74)
UROBILINOGEN UR STRIP.AUTO-MCNC: 0.2 MG/DL
VLDLC SERPL CALC-MCNC: 58 MG/DL (ref 0–30)
WBC # BLD AUTO: 7.7 X10(3) UL (ref 4–11)

## 2023-01-25 PROCEDURE — 83036 HEMOGLOBIN GLYCOSYLATED A1C: CPT | Performed by: FAMILY MEDICINE

## 2023-01-25 PROCEDURE — 80061 LIPID PANEL: CPT | Performed by: FAMILY MEDICINE

## 2023-01-25 PROCEDURE — 84153 ASSAY OF PSA TOTAL: CPT | Performed by: FAMILY MEDICINE

## 2023-01-25 PROCEDURE — 80050 GENERAL HEALTH PANEL: CPT | Performed by: FAMILY MEDICINE

## 2023-01-25 PROCEDURE — 81003 URINALYSIS AUTO W/O SCOPE: CPT | Performed by: FAMILY MEDICINE

## 2023-01-26 ENCOUNTER — HOSPITAL ENCOUNTER (OUTPATIENT)
Dept: CV DIAGNOSTICS | Facility: HOSPITAL | Age: 43
Discharge: HOME | End: 2023-01-26
Attending: FAMILY MEDICINE
Payer: COMMERCIAL

## 2023-01-26 DIAGNOSIS — E78.00 HYPERCHOLESTEREMIA: ICD-10-CM

## 2023-01-26 DIAGNOSIS — I51.7 LVH (LEFT VENTRICULAR HYPERTROPHY): ICD-10-CM

## 2023-01-26 DIAGNOSIS — R07.2 CHEST PAIN, PRECORDIAL: ICD-10-CM

## 2023-01-26 PROCEDURE — 93350 STRESS TTE ONLY: CPT | Performed by: FAMILY MEDICINE

## 2023-01-26 PROCEDURE — 93017 CV STRESS TEST TRACING ONLY: CPT | Performed by: FAMILY MEDICINE

## 2023-01-26 PROCEDURE — 93018 CV STRESS TEST I&R ONLY: CPT | Performed by: FAMILY MEDICINE

## 2023-01-28 ENCOUNTER — HOSPITAL ENCOUNTER (OUTPATIENT)
Dept: CT IMAGING | Age: 43
Discharge: HOME | End: 2023-01-28
Attending: FAMILY MEDICINE

## 2023-01-28 DIAGNOSIS — Z13.6 SCREENING FOR CARDIOVASCULAR CONDITION: ICD-10-CM

## 2023-02-07 ENCOUNTER — TELEPHONE (OUTPATIENT)
Dept: FAMILY MEDICINE CLINIC | Facility: CLINIC | Age: 43
End: 2023-02-07

## 2023-02-07 NOTE — TELEPHONE ENCOUNTER
S/w Chris      Pt is requesting  Dr. Dorian Ashby input regarding new medication his cardiologist had prescribed. Atorvastatin 20 mg 1 tablet every day. Pt wants to know if Dr. Lisa Pelaez agrees with taking this statin? Please advise.

## 2023-02-07 NOTE — TELEPHONE ENCOUNTER
Pt informed of below and voiced understanding.   Agreed to start Atorvastatin as advised by his cardiologist.

## 2023-02-07 NOTE — TELEPHONE ENCOUNTER
Pt called and wanted to speak with nurse or Dr. Garcia Listen wanted to discuss his medications and had some concerns/ questions about them.     Please advise

## 2023-02-07 NOTE — TELEPHONE ENCOUNTER
Yes patient should be starting medication to lower cholesterol as recommended by cardiologist.  Thank you

## 2023-02-08 ENCOUNTER — MED REC SCAN ONLY (OUTPATIENT)
Dept: FAMILY MEDICINE CLINIC | Facility: CLINIC | Age: 43
End: 2023-02-08

## 2023-04-07 ENCOUNTER — LAB ENCOUNTER (OUTPATIENT)
Dept: LAB | Age: 43
End: 2023-04-07
Attending: INTERNAL MEDICINE
Payer: COMMERCIAL

## 2023-04-07 DIAGNOSIS — E78.00 PURE HYPERCHOLESTEROLEMIA: Primary | ICD-10-CM

## 2023-04-07 LAB
CHOLEST SERPL-MCNC: 136 MG/DL (ref ?–200)
FASTING PATIENT LIPID ANSWER: YES
HDLC SERPL-MCNC: 35 MG/DL (ref 40–59)
LDLC SERPL CALC-MCNC: 82 MG/DL (ref ?–100)
NONHDLC SERPL-MCNC: 101 MG/DL (ref ?–130)
TRIGL SERPL-MCNC: 98 MG/DL (ref 30–149)
VLDLC SERPL CALC-MCNC: 15 MG/DL (ref 0–30)

## 2023-04-07 PROCEDURE — 36415 COLL VENOUS BLD VENIPUNCTURE: CPT

## 2023-04-07 PROCEDURE — 80061 LIPID PANEL: CPT

## 2023-04-19 ENCOUNTER — OFFICE VISIT (OUTPATIENT)
Dept: FAMILY MEDICINE CLINIC | Facility: CLINIC | Age: 43
End: 2023-04-19
Payer: COMMERCIAL

## 2023-04-19 ENCOUNTER — MED REC SCAN ONLY (OUTPATIENT)
Dept: FAMILY MEDICINE CLINIC | Facility: CLINIC | Age: 43
End: 2023-04-19

## 2023-04-19 VITALS
WEIGHT: 205.38 LBS | HEART RATE: 76 BPM | BODY MASS INDEX: 30.77 KG/M2 | TEMPERATURE: 98 F | HEIGHT: 68.5 IN | DIASTOLIC BLOOD PRESSURE: 74 MMHG | SYSTOLIC BLOOD PRESSURE: 118 MMHG

## 2023-04-19 DIAGNOSIS — Z00.00 PHYSICAL EXAM, ANNUAL: Primary | ICD-10-CM

## 2023-04-19 DIAGNOSIS — E78.00 HYPERCHOLESTEREMIA: ICD-10-CM

## 2023-04-19 DIAGNOSIS — R73.9 HYPERGLYCEMIA: ICD-10-CM

## 2023-04-19 PROCEDURE — 3074F SYST BP LT 130 MM HG: CPT | Performed by: FAMILY MEDICINE

## 2023-04-19 PROCEDURE — 99396 PREV VISIT EST AGE 40-64: CPT | Performed by: FAMILY MEDICINE

## 2023-04-19 PROCEDURE — 3078F DIAST BP <80 MM HG: CPT | Performed by: FAMILY MEDICINE

## 2023-04-19 PROCEDURE — 3008F BODY MASS INDEX DOCD: CPT | Performed by: FAMILY MEDICINE

## 2023-04-19 RX ORDER — ATORVASTATIN CALCIUM 20 MG/1
TABLET, FILM COATED ORAL
COMMUNITY
Start: 2023-02-20

## 2023-11-07 ENCOUNTER — LAB ENCOUNTER (OUTPATIENT)
Dept: LAB | Age: 43
End: 2023-11-07
Attending: FAMILY MEDICINE
Payer: COMMERCIAL

## 2023-11-07 DIAGNOSIS — R73.9 HYPERGLYCEMIA: ICD-10-CM

## 2023-11-07 DIAGNOSIS — E78.00 HYPERCHOLESTEREMIA: ICD-10-CM

## 2023-11-07 LAB
ALBUMIN SERPL-MCNC: 3.9 G/DL (ref 3.4–5)
ALBUMIN/GLOB SERPL: 1 {RATIO} (ref 1–2)
ALP LIVER SERPL-CCNC: 75 U/L
ALT SERPL-CCNC: 50 U/L
ANION GAP SERPL CALC-SCNC: 4 MMOL/L (ref 0–18)
AST SERPL-CCNC: 23 U/L (ref 15–37)
BILIRUB SERPL-MCNC: 0.6 MG/DL (ref 0.1–2)
BUN BLD-MCNC: 22 MG/DL (ref 9–23)
CALCIUM BLD-MCNC: 9.2 MG/DL (ref 8.5–10.1)
CHLORIDE SERPL-SCNC: 108 MMOL/L (ref 98–112)
CO2 SERPL-SCNC: 26 MMOL/L (ref 21–32)
CREAT BLD-MCNC: 1.12 MG/DL
EGFRCR SERPLBLD CKD-EPI 2021: 84 ML/MIN/1.73M2 (ref 60–?)
EST. AVERAGE GLUCOSE BLD GHB EST-MCNC: 126 MG/DL (ref 68–126)
FASTING STATUS PATIENT QL REPORTED: YES
GLOBULIN PLAS-MCNC: 3.9 G/DL (ref 2.8–4.4)
GLUCOSE BLD-MCNC: 102 MG/DL (ref 70–99)
HBA1C MFR BLD: 6 % (ref ?–5.7)
OSMOLALITY SERPL CALC.SUM OF ELEC: 290 MOSM/KG (ref 275–295)
POTASSIUM SERPL-SCNC: 3.9 MMOL/L (ref 3.5–5.1)
PROT SERPL-MCNC: 7.8 G/DL (ref 6.4–8.2)
SODIUM SERPL-SCNC: 138 MMOL/L (ref 136–145)

## 2023-11-07 PROCEDURE — 83036 HEMOGLOBIN GLYCOSYLATED A1C: CPT

## 2023-11-07 PROCEDURE — 80053 COMPREHEN METABOLIC PANEL: CPT

## 2023-11-07 PROCEDURE — 36415 COLL VENOUS BLD VENIPUNCTURE: CPT

## 2023-11-21 ENCOUNTER — MED REC SCAN ONLY (OUTPATIENT)
Dept: FAMILY MEDICINE CLINIC | Facility: CLINIC | Age: 43
End: 2023-11-21

## 2024-02-07 ENCOUNTER — TELEPHONE (OUTPATIENT)
Dept: FAMILY MEDICINE CLINIC | Facility: CLINIC | Age: 44
End: 2024-02-07

## 2024-02-07 ENCOUNTER — OFFICE VISIT (OUTPATIENT)
Dept: FAMILY MEDICINE CLINIC | Facility: CLINIC | Age: 44
End: 2024-02-07
Payer: COMMERCIAL

## 2024-02-07 VITALS
TEMPERATURE: 97 F | SYSTOLIC BLOOD PRESSURE: 100 MMHG | HEIGHT: 68 IN | RESPIRATION RATE: 18 BRPM | WEIGHT: 213 LBS | BODY MASS INDEX: 32.28 KG/M2 | OXYGEN SATURATION: 96 % | DIASTOLIC BLOOD PRESSURE: 78 MMHG | HEART RATE: 77 BPM

## 2024-02-07 DIAGNOSIS — J34.89 RHINORRHEA: ICD-10-CM

## 2024-02-07 DIAGNOSIS — R09.81 NASAL CONGESTION: ICD-10-CM

## 2024-02-07 DIAGNOSIS — J06.9 UPPER RESPIRATORY TRACT INFECTION, UNSPECIFIED TYPE: Primary | ICD-10-CM

## 2024-02-07 LAB
COVID19 BINAX NOW ANTIGEN: NOT DETECTED
OPERATOR ID: NORMAL
POCT EXPIRATION DATE: NORMAL

## 2024-02-07 PROCEDURE — 3074F SYST BP LT 130 MM HG: CPT

## 2024-02-07 PROCEDURE — 3078F DIAST BP <80 MM HG: CPT

## 2024-02-07 PROCEDURE — 87637 SARSCOV2&INF A&B&RSV AMP PRB: CPT

## 2024-02-07 PROCEDURE — 3008F BODY MASS INDEX DOCD: CPT

## 2024-02-07 PROCEDURE — 99213 OFFICE O/P EST LOW 20 MIN: CPT

## 2024-02-07 NOTE — PROGRESS NOTES
[facility] Office Note  Chief Complaint:   Chief Complaint   Patient presents with    Fever      Body ache       HPI:   This is a 43 year old male coming in for symptoms of body aches, fatigue, runny nose, and fevers. Symptoms initially started on Friday night with dry cough which has since improved. On Tuesday night patient developed fevers ranging from 101-102F. Fevers have been coming down with ibuprofen. Patient is afebrile at this time.   Denies shortness of breath, chest pain, chest tightness, or sore throat.     Has not taken a home covid test.    Patient has 2 kids. States his son was recently sick with unknown illness for a few week.     No past medical history on file.  Past Surgical History:   Procedure Laterality Date    APPENDECTOMY      OTHER SURGICAL HISTORY  2012    lipomas     Social History:  Social History     Socioeconomic History    Marital status:    Tobacco Use    Smoking status: Former     Types: Cigarettes     Quit date: 2015     Years since quittin.2    Smokeless tobacco: Former   Vaping Use    Vaping Use: Never used   Substance and Sexual Activity    Alcohol use: Yes     Alcohol/week: 0.0 standard drinks of alcohol     Comment: WEEKENDS    Drug use: No    Sexual activity: Yes     Partners: Female   Other Topics Concern    Caffeine Concern No    Exercise Yes    Seat Belt Yes    Special Diet No    Stress Concern No    Weight Concern Yes     Family History:  Family History   Problem Relation Age of Onset    Other (gout) Father     Other (boreliosis) Mother     Heart Disorder Paternal Grandfather     Diabetes Paternal Grandfather     Stroke Paternal Grandfather      Allergies:  No Known Allergies  Current Meds:  Current Outpatient Medications   Medication Sig Dispense Refill    atorvastatin 20 MG Oral Tab         Counseling given: Not Answered       REVIEW OF SYSTEMS:   Review of Systems   Constitutional:  Positive for fatigue and fever.   HENT:   Positive for congestion, postnasal drip and rhinorrhea. Negative for ear pain, sinus pressure, sore throat and trouble swallowing.    Eyes: Negative.    Respiratory:  Positive for cough. Negative for chest tightness, shortness of breath and wheezing.    Cardiovascular: Negative.  Negative for chest pain.   Gastrointestinal: Negative.    Endocrine: Negative.    Genitourinary: Negative.    Musculoskeletal: Negative.    Skin: Negative.    Allergic/Immunologic: Negative.    Neurological: Negative.    Hematological: Negative.    Psychiatric/Behavioral: Negative.           EXAM:   /78   Pulse 77   Temp 97 °F (36.1 °C) (Temporal)   Resp 18   Ht 5' 8\" (1.727 m)   Wt 213 lb (96.6 kg)   SpO2 96%   BMI 32.39 kg/m²  Estimated body mass index is 32.39 kg/m² as calculated from the following:    Height as of this encounter: 5' 8\" (1.727 m).    Weight as of this encounter: 213 lb (96.6 kg).   Vital signs reviewed.Appears stated age, well groomed.  Physical Exam  Constitutional:       Appearance: Normal appearance.   HENT:      Head: Normocephalic and atraumatic.      Right Ear: Tympanic membrane normal.      Left Ear: Tympanic membrane normal.      Nose: Congestion and rhinorrhea present.      Right Sinus: No frontal sinus tenderness.      Left Sinus: No maxillary sinus tenderness or frontal sinus tenderness.      Mouth/Throat:      Mouth: Mucous membranes are moist.      Pharynx: Oropharynx is clear. No oropharyngeal exudate or posterior oropharyngeal erythema.   Eyes:      Extraocular Movements: Extraocular movements intact.      Conjunctiva/sclera: Conjunctivae normal.      Pupils: Pupils are equal, round, and reactive to light.   Cardiovascular:      Rate and Rhythm: Normal rate and regular rhythm.      Pulses: Normal pulses.      Heart sounds: Normal heart sounds. No murmur heard.  Pulmonary:      Effort: Pulmonary effort is normal.      Breath sounds: Normal breath sounds. No wheezing, rhonchi or rales.    Musculoskeletal:      Cervical back: Normal range of motion. No tenderness.   Lymphadenopathy:      Cervical: No cervical adenopathy.   Skin:     General: Skin is warm and dry.      Capillary Refill: Capillary refill takes less than 2 seconds.   Neurological:      General: No focal deficit present.      Mental Status: He is alert and oriented to person, place, and time.   Psychiatric:         Mood and Affect: Mood normal.         Behavior: Behavior normal.             ASSESSMENT AND PLAN:     1. Upper respiratory tract infection, unspecified type    2. Nasal congestion    3. Rhinorrhea      Covid test completed in office--negative.   Will send out respiratory panel.   Suspect viral etiology. At this time recommend supportive measures.     May continue symptomatic treatment as needed unless otherwise contraindicated including but not limited to:   Mucinex DM or Robitussin DM as needed  Nasal saline rinse such as Ocean nasal spray  Tylenol or Motrin as needed for aches and fevers.   Oral antihistamine such as Zyrtec/Allegra/Xyzal or nasal Astepro.   Nasal steroid such as Flonase or Nasacort.  Drink plenty of water and electrolytes such as Gatorade G2    Call on Friday with an update.     Meds & Refills for this Visit:  Requested Prescriptions      No prescriptions requested or ordered in this encounter       Health Maintenance:  Health Maintenance Due   Topic Date Due    COVID-19 Vaccine (1) Never done    Influenza Vaccine (1) Never done    Annual Depression Screening  01/01/2024    Annual Physical  04/19/2024       Patient/Caregiver Education: Patient/Caregiver Education: There are no barriers to learning. Medical education done.   Outcome: Patient verbalizes understanding. Patient is notified to call with any questions, complications, allergies, or worsening or changing symptoms.  Patient is to call with any side effects or complications from the treatments as a result of today.     Problem List:  Patient Active  Problem List   Diagnosis    Burn    Hypercholesteremia    Abnormal finding on EKG       Lara Parra, APRN    Please note that portions of this note may have been completed with a voice recognition program. Efforts were made to edit the dictations but occasionally words are mis-transcribed.    The 21st Century Cures Act makes medical notes like these available to patients in the interest of transparency. Please be advised this is a medical document. Medical documents are intended to carry relevant information, facts as evident, and the clinical opinion of the practitioner. The medical note is intended as peer to peer communication and may appear blunt or direct. It is written in medical language and may contain abbreviations or verbiage that are unfamiliar.

## 2024-02-07 NOTE — PATIENT INSTRUCTIONS
May continue symptomatic treatment as needed unless otherwise contraindicated including but not limited to:   Mucinex DM or Robitussin DM as needed  Nasal saline rinse such as Ocean nasal spray  Tylenol or Motrin as needed for aches and fevers.   Oral antihistamine such as Zyrtec/Allegra/Xyzal or nasal Astepro.   Nasal steroid such as Flonase or Nasacort.  Drink plenty of water and electrolytes such as Gatorade G2    Call on Friday with an update.    normal shape/ROM intact/no gross tenderness appreciated/strength intact

## 2024-02-09 LAB
FLUAV + FLUBV RNA SPEC NAA+PROBE: DETECTED
FLUAV + FLUBV RNA SPEC NAA+PROBE: NOT DETECTED
RSV RNA SPEC NAA+PROBE: NOT DETECTED
SARS-COV-2 RNA RESP QL NAA+PROBE: NOT DETECTED

## 2024-04-02 ENCOUNTER — TELEPHONE (OUTPATIENT)
Dept: FAMILY MEDICINE CLINIC | Facility: CLINIC | Age: 44
End: 2024-04-02

## 2024-04-02 DIAGNOSIS — Z00.00 BLOOD TESTS FOR ROUTINE GENERAL PHYSICAL EXAMINATION: ICD-10-CM

## 2024-04-02 DIAGNOSIS — Z13.89 SCREENING FOR GENITOURINARY CONDITION: Primary | ICD-10-CM

## 2024-04-19 NOTE — TELEPHONE ENCOUNTER
Appt set up with Lara Parra today at 1030 AM.  
PT C/O the following SX's/Onset today    Fever 101/102  Body aches  Congestion    Denies any SOB    Would like to see Dr. ALVAREZ or Lara.  Sent Dr. garcia.    TY  
show

## 2024-05-27 ENCOUNTER — HOSPITAL ENCOUNTER (OUTPATIENT)
Age: 44
Discharge: HOME | End: 2024-05-27
Attending: EMERGENCY MEDICINE

## 2024-05-27 ENCOUNTER — APPOINTMENT (OUTPATIENT)
Dept: CT IMAGING | Age: 44
End: 2024-05-27
Attending: EMERGENCY MEDICINE

## 2024-05-27 VITALS
SYSTOLIC BLOOD PRESSURE: 141 MMHG | OXYGEN SATURATION: 94 % | TEMPERATURE: 98 F | DIASTOLIC BLOOD PRESSURE: 78 MMHG | RESPIRATION RATE: 20 BRPM | HEART RATE: 79 BPM

## 2024-05-27 DIAGNOSIS — K20.90 ESOPHAGITIS: Primary | ICD-10-CM

## 2024-05-27 PROCEDURE — 93010 ELECTROCARDIOGRAM REPORT: CPT

## 2024-05-27 PROCEDURE — 99214 OFFICE O/P EST MOD 30 MIN: CPT

## 2024-05-27 PROCEDURE — 71250 CT THORAX DX C-: CPT | Performed by: EMERGENCY MEDICINE

## 2024-05-27 PROCEDURE — 93005 ELECTROCARDIOGRAM TRACING: CPT

## 2024-05-27 RX ORDER — LIDOCAINE HYDROCHLORIDE 20 MG/ML
10 SOLUTION OROPHARYNGEAL ONCE
Status: COMPLETED | OUTPATIENT
Start: 2024-05-27 | End: 2024-05-27

## 2024-05-27 RX ORDER — MAGNESIUM HYDROXIDE/ALUMINUM HYDROXICE/SIMETHICONE 120; 1200; 1200 MG/30ML; MG/30ML; MG/30ML
30 SUSPENSION ORAL ONCE
Status: COMPLETED | OUTPATIENT
Start: 2024-05-27 | End: 2024-05-27

## 2024-05-27 RX ORDER — PANTOPRAZOLE SODIUM 40 MG/1
40 TABLET, DELAYED RELEASE ORAL DAILY
Qty: 30 TABLET | Refills: 0 | Status: SHIPPED | OUTPATIENT
Start: 2024-05-27 | End: 2024-06-26

## 2024-05-27 NOTE — ED INITIAL ASSESSMENT (HPI)
Pt c/o sensation of something stuck in his  throat after eating a hamburger today  while he was in [place]  while driving home sensation of  FB is getting worse with nausea. . Saturday he states he almost choked on a piece of meat  while he was in [place]. Pt was seen in an urgent care pt was advised to go to the ER .

## 2024-05-27 NOTE — DISCHARGE INSTRUCTIONS
Follow up with [facility]  Take pantoprazole as prescribed  Return to ER if any worsening symptoms or new concern

## 2024-05-27 NOTE — ED PROVIDER NOTES
Patient Seen in: [facility]      History     Chief Complaint   Patient presents with    Throat Problem    Foreign Body     Stated Complaint: Throat Pain / feels like there may be something stuck    Subjective:   HPI    43-year-old male presents to the immediate care for complaints of a feeling of a foreign body sensation in his throat.  Patient states that he was in [place] this weekend.  He states that he ate a hamburger yesterday and felt a discomfort and burning in his throat and esophagus.  Patient went to a local immediate care.  He was advised to go to the emergency department but he never did.  Patient states that he was driving home today and again felt some discomfort in his throat and esophagus and burning sensation after eating something.  He feels as though he needs to throw up but has been tolerating his oral secretions.    Objective:   History reviewed. No pertinent past medical history.           Past Surgical History:   Procedure Laterality Date    Appendectomy  1992    Other surgical history  2012    lipomas                No pertinent social history.            Review of Systems    Positive for stated complaint: Throat Pain / feels like there may be something stuck  Other systems are as noted in HPI.  Constitutional and vital signs reviewed.      All other systems reviewed and negative except as noted above.    Physical Exam     ED Triage Vitals [05/27/24 1352]   /78   Pulse 79   Resp 20   Temp 97.7 °F (36.5 °C)   Temp src    SpO2 94 %   O2 Device        Current Vitals:   Vital Signs  BP: 141/78  Pulse: 79  Resp: 20  Temp: 97.7 °F (36.5 °C)    Oxygen Therapy  SpO2: 94 %            Physical Exam    General: Alert and oriented. No acute distress.  HEENT: Normocephalic. No evidence of trauma. Extraocular movements are intact.  Cardiovascular exam: Regular rate and rhythm  Lungs: Clear to auscultation bilaterally.  Abdomen: Soft, nondistended, nontender.  Extremities: No  evidence of deformity. No clubbing or cyanosis.  Neuro: No focal deficit is noted.    ED Course   Labs Reviewed - No data to display  EKG    Rate, intervals and axes as noted on EKG Report.  Rate: 65  Rhythm: Sinus Rhythm  Reading: Normal sinus rhythm.  Ventricular at 65.  No acute ST elevations or depressions.  Rate, axis, normals are noted.  Agree with computer interpretation.  EKG is unchanged compared to prior EKG January 18, 2023.         Patient's clinical symptoms appear to be consistent either with a esophageal meat impaction versus gastroesophageal reflux or possible esophagitis.  Patient does not appear to be in any distress at this time.  He is tolerating his oral secretions.  Patient was given a trial of viscous lidocaine and Maalox.  If he is able to tolerate this, patient will be discharged home with Protonix.  Recommend clear liquid diet for the next 24 hours.  He will be given follow-up with gastroenterology.  Should he develop any persistent symptoms or inability to tolerate p.o. intake, would recommend follow-up in the emergency department for further evaluation.    Review of medical chart shows that patient had a stress echocardiogram January 26, 2023 which was negative for evidence of ischemia  INTERPRETATION: This patient exercised for 9 minutes and 10 seconds on a Chris protocol, stopping due to fatigue.  Patient achieved a peak heart rate of 169 beats per minute (94% of their APMHR) and a peak blood pressure of 180/78 mmHg.  There were 8.2 METs achieved, which is average for their age.  The EKG was negative for ischemia. No ST depression or angina occurred.  Patient denied cardiac symptoms.  No arrhythmias noted.     Resting echocardiographic images revealed normal chamber sizes and valvular structure with uniformly normal left ventricular contractility.     Immediate post exercise imaging revealed a decrease in left ventricular size and increased contractility of all wall segments consistent  with a normal stress echocardiogram response.     IMPRESSION:  Normal stress echocardiogram.           eddie NARVAEZ MD 01/26/2023 14:30  t  [id number] 1/26/2023 3:33 PM #[id number]              [facility]   Patient was screened and evaluated during this visit.   As a treating physician attending to the patient, I determined, within reasonable clinical confidence and prior to discharge, that an emergency medical condition was not or was no longer present.  There was no indication for further evaluation, treatment or admission on an emergency basis.  Comprehensive verbal and written discharge and follow-up instructions were provided to help prevent relapse or worsening.  Patient was instructed to follow-up with her primary care provider for further evaluation and treatment, but to return immediately to the ER for worsening, concerning, new, changing or persisting symptoms.  I discussed the case with the patient and they had no questions, complaints, or concerns.  Patient felt comfortable going home.    ^^Please note that this report has been produced using speech recognition software and may contain errors related to that system including, but not limited to, errors in grammar, punctuation, and spelling, as well as words and phrases that possibly may have been recognized inappropriately.  If there are any questions or concerns, contact the dictating provider for clarification                                   Medical Decision Making      Disposition and Plan     Clinical Impression:  1. Esophagitis         Disposition:  Discharge  5/27/2024  3:14 pm    Follow-up:  [facility] GASTROENTEROLOG[facility] [address] [address]  Schedule an appointment as soon as possible for a visit             Medications Prescribed:  Discharge Medication List as of 5/27/2024  3:16 PM        START taking these medications    Details   pantoprazole 40 MG Oral Tab EC Take 1 tablet (40 mg total) by mouth daily.,  Normal, Disp-30 tablet, R-0

## 2024-05-27 NOTE — ED QUICK NOTES
Pt states discomfort has subsided after taking the medication but still feels something is stuck in his throat

## 2024-05-28 LAB
ATRIAL RATE: 65 BPM
P AXIS: 37 DEGREES
P-R INTERVAL: 204 MS
Q-T INTERVAL: 376 MS
QRS DURATION: 92 MS
QTC CALCULATION (BEZET): 391 MS
R AXIS: -18 DEGREES
T AXIS: 5 DEGREES
VENTRICULAR RATE: 65 BPM

## 2024-05-29 ENCOUNTER — HOSPITAL ENCOUNTER (EMERGENCY)
Facility: HOSPITAL | Age: 44
Discharge: HOME | End: 2024-05-29
Attending: EMERGENCY MEDICINE
Payer: COMMERCIAL

## 2024-05-29 VITALS
WEIGHT: 208 LBS | OXYGEN SATURATION: 96 % | HEIGHT: 74 IN | DIASTOLIC BLOOD PRESSURE: 92 MMHG | BODY MASS INDEX: 26.69 KG/M2 | SYSTOLIC BLOOD PRESSURE: 150 MMHG | RESPIRATION RATE: 18 BRPM | HEART RATE: 59 BPM | TEMPERATURE: 98 F

## 2024-05-29 DIAGNOSIS — R09.A2 FOREIGN BODY SENSATION, THROAT: Primary | ICD-10-CM

## 2024-05-29 PROCEDURE — 99283 EMERGENCY DEPT VISIT LOW MDM: CPT

## 2024-05-29 RX ORDER — SUCRALFATE ORAL 1 G/10ML
1 SUSPENSION ORAL
Qty: 280 ML | Refills: 0 | Status: SHIPPED | OUTPATIENT
Start: 2024-05-29 | End: 2024-06-05

## 2024-05-29 NOTE — ED INITIAL ASSESSMENT (HPI)
Pt ambulatory to ED c/o feeling like foreign body in throat since Saturday, patient states feeling worse when eating, patient speaking clearing in triage, seen at [facility] 5/27 for same - CT chest done with no evidence of foreign body

## 2024-05-30 NOTE — DISCHARGE INSTRUCTIONS
Soft diet  Drink plenty of fluids  Continue pantoprazole  Mylanta after you eat before you go to bed  Carafate as prescribed  Return if worse  Follow-up with gastroenterology as scheduled, call to see if there is any cancellations and you may be able to get a sooner appointment

## 2024-05-30 NOTE — ED PROVIDER NOTES
Patient Seen in: [facility] Emergency Department      History     Chief Complaint   Patient presents with    FB in Throat     Stated Complaint: fb to thraot feeling from sat    Subjective:   HPI    This is a 43-year-old male that has a foreign body sensation in his throat since Saturday.  Patient had similar symptoms and was seen in urgent care on  and had a CT chest done with no evidence of foreign body.  Patient states last Saturday he felt like he choked on hamburger.  He states he did the Heimlich maneuver on him.  Since then he had a foreign body sensation on the right side of his neck.  He still able to swallow and drink.  At times it is painful and he states he is concerned which is why he came back again.  He denies any fevers.  No difficulty breathing.  He made an appointment with [facility] and has an appointment  but feels like he cannot wait any longer.  He presents here for further evaluation.    Objective:   History reviewed. No pertinent past medical history.           Past Surgical History:   Procedure Laterality Date    Appendectomy      Other surgical history  2012    lipomas                Social History     Socioeconomic History    Marital status:    Tobacco Use    Smoking status: Former     Current packs/day: 0.00     Types: Cigarettes     Quit date: 2015     Years since quittin.5    Smokeless tobacco: Former   Vaping Use    Vaping status: Never Used   Substance and Sexual Activity    Alcohol use: Yes     Alcohol/week: 0.0 standard drinks of alcohol     Comment: WEEKENDS    Drug use: No    Sexual activity: Yes     Partners: Female   Other Topics Concern    Caffeine Concern No    Exercise Yes    Seat Belt Yes    Special Diet No    Stress Concern No    Weight Concern Yes              Review of Systems    Positive for stated complaint: fb to thraot feeling from sat  Other systems are as noted in HPI.  Constitutional and vital signs reviewed.      All other  systems reviewed and negative except as noted above.    Physical Exam     ED Triage Vitals [05/29/24 1734]   BP (!) 150/92   Pulse 59   Resp 18   Temp 97.5 °F (36.4 °C)   Temp src Temporal   SpO2 96 %   O2 Device        Current Vitals:   Vital Signs  BP: (!) 150/92  Pulse: 59  Resp: 18  Temp: 97.5 °F (36.4 °C)  Temp src: Temporal    Oxygen Therapy  SpO2: 96 %            Physical Exam    GENERAL: Awake, alert oriented x3, nontoxic appearing.   SKIN: Normal, warm, and dry.  HEENT:  Pupils equally round and reactive to light. Conjuctiva clear.  Oropharynx is clear and moist.  Some tenderness in the right submandibular area.  No palpable mass or lesion.  No stridor.  No trismus.  He can breathe normally.  Swallow without difficulty.  Lungs: Clear to auscultation bilaterally with no rales, no retractions, and no wheezing.  HEART:  Regular rate and rhythm. S1 and S2. No murmurs, no rubs or gallops.   ABDOMEN: Soft, nontender and nondistended. Normoactive bowel sounds. No rebound. No guarding.   EXTREMITIES: Warm with brisk capillary refill.         ED Course   Labs Reviewed - No data to display                   MDM      This is a 43-year-old male that has a foreign body sensation in his throat since Saturday.  Patient had similar symptoms and was seen in urgent care on 5/27 and had a CT chest done with no evidence of foreign body.  Patient states last Saturday he felt like he choked on hamburger.  He states he did the Heimlich maneuver on him.  Since then he had a foreign body sensation on the right side of his neck.  Differential includes esophagitis, GERD.        I discussed with patient that there is nothing further we can do in the emergency room and he ready had imaging done.  He has an appointment with her been GI.  Would recommend continuing pantoprazole will add Carafate and Mylanta.  Seligman diet.  Avoid alcohol or caffeine or anything spicy.  Return if worse.  Call the GI office and see if can get a sooner  appointment with any cancellations.    He may return if worse if he has any difficulty swallowing or fevers.    He was discharged home in good condition.        Disposition and Plan     Clinical Impression:  1. Foreign body sensation, throat         Disposition:  Discharge  5/29/2024  7:17 pm    Follow-up:  Daniel Bonner MD  [address] [place] [address]  [phone]    Follow up on 6/12/2024            Medications Prescribed:  Current Discharge Medication List        START taking these medications    Details   sucralfate 1 GM/10ML Oral Suspension Take 10 mL (1 g total) by mouth 4 (four) times daily before meals and nightly for 7 days.  Qty: 280 mL, Refills: 0

## 2024-06-12 PROBLEM — R07.9 CHEST PAIN, UNSPECIFIED: Status: ACTIVE | Noted: 2023-02-02

## 2024-06-12 PROBLEM — E66.9 CLASS 1 OBESITY: Status: ACTIVE | Noted: 2023-11-10

## 2024-06-12 PROBLEM — R73.09 ELEVATED GLUCOSE LEVEL: Status: ACTIVE | Noted: 2023-11-10

## 2024-06-12 PROBLEM — E66.811 CLASS 1 OBESITY: Status: ACTIVE | Noted: 2023-11-10

## 2024-06-12 PROBLEM — R94.31 ABNORMAL EKG: Status: ACTIVE | Noted: 2020-01-15

## 2024-06-19 ENCOUNTER — LAB ENCOUNTER (OUTPATIENT)
Dept: LAB | Age: 44
End: 2024-06-19
Attending: FAMILY MEDICINE

## 2024-06-19 DIAGNOSIS — R73.01 ELEVATED FASTING GLUCOSE: ICD-10-CM

## 2024-06-19 DIAGNOSIS — Z13.89 SCREENING FOR GENITOURINARY CONDITION: ICD-10-CM

## 2024-06-19 DIAGNOSIS — Z00.00 BLOOD TESTS FOR ROUTINE GENERAL PHYSICAL EXAMINATION: ICD-10-CM

## 2024-06-19 DIAGNOSIS — R73.01 ELEVATED FASTING GLUCOSE: Primary | ICD-10-CM

## 2024-06-19 LAB
ALBUMIN SERPL-MCNC: 3.9 G/DL (ref 3.4–5)
ALBUMIN/GLOB SERPL: 1.1 {RATIO} (ref 1–2)
ALP LIVER SERPL-CCNC: 65 U/L
ALT SERPL-CCNC: 41 U/L
ANION GAP SERPL CALC-SCNC: 4 MMOL/L (ref 0–18)
AST SERPL-CCNC: 20 U/L (ref 15–37)
BASOPHILS # BLD AUTO: 0.03 X10(3) UL (ref 0–0.2)
BASOPHILS NFR BLD AUTO: 0.5 %
BILIRUB SERPL-MCNC: 0.4 MG/DL (ref 0.1–2)
BILIRUB UR QL STRIP.AUTO: NEGATIVE
BUN BLD-MCNC: 17 MG/DL (ref 9–23)
CALCIUM BLD-MCNC: 9 MG/DL (ref 8.5–10.1)
CHLORIDE SERPL-SCNC: 110 MMOL/L (ref 98–112)
CHOLEST SERPL-MCNC: 163 MG/DL (ref ?–200)
CLARITY UR REFRACT.AUTO: CLEAR
CO2 SERPL-SCNC: 27 MMOL/L (ref 21–32)
COLOR UR AUTO: YELLOW
COMPLEXED PSA SERPL-MCNC: 0.53 NG/ML (ref ?–4)
CREAT BLD-MCNC: 1 MG/DL
EGFRCR SERPLBLD CKD-EPI 2021: 96 ML/MIN/1.73M2 (ref 60–?)
EOSINOPHIL # BLD AUTO: 0.26 X10(3) UL (ref 0–0.7)
EOSINOPHIL NFR BLD AUTO: 4.1 %
ERYTHROCYTE [DISTWIDTH] IN BLOOD BY AUTOMATED COUNT: 12.9 %
EST. AVERAGE GLUCOSE BLD GHB EST-MCNC: 114 MG/DL (ref 68–126)
FASTING PATIENT LIPID ANSWER: YES
FASTING STATUS PATIENT QL REPORTED: YES
GLOBULIN PLAS-MCNC: 3.5 G/DL (ref 2.8–4.4)
GLUCOSE BLD-MCNC: 103 MG/DL (ref 70–99)
GLUCOSE UR STRIP.AUTO-MCNC: NORMAL MG/DL
HBA1C MFR BLD: 5.6 % (ref ?–5.7)
HCT VFR BLD AUTO: 42.9 %
HDLC SERPL-MCNC: 43 MG/DL (ref 40–59)
HGB BLD-MCNC: 14.3 G/DL
IMM GRANULOCYTES # BLD AUTO: 0.01 X10(3) UL (ref 0–1)
IMM GRANULOCYTES NFR BLD: 0.2 %
KETONES UR STRIP.AUTO-MCNC: NEGATIVE MG/DL
LDLC SERPL CALC-MCNC: 90 MG/DL (ref ?–100)
LEUKOCYTE ESTERASE UR QL STRIP.AUTO: NEGATIVE
LYMPHOCYTES # BLD AUTO: 2.65 X10(3) UL (ref 1–4)
LYMPHOCYTES NFR BLD AUTO: 41.9 %
MCH RBC QN AUTO: 28.5 PG (ref 26–34)
MCHC RBC AUTO-ENTMCNC: 33.3 G/DL (ref 31–37)
MCV RBC AUTO: 85.5 FL
MONOCYTES # BLD AUTO: 0.57 X10(3) UL (ref 0.1–1)
MONOCYTES NFR BLD AUTO: 9 %
NEUTROPHILS # BLD AUTO: 2.81 X10 (3) UL (ref 1.5–7.7)
NEUTROPHILS # BLD AUTO: 2.81 X10(3) UL (ref 1.5–7.7)
NEUTROPHILS NFR BLD AUTO: 44.3 %
NITRITE UR QL STRIP.AUTO: NEGATIVE
NONHDLC SERPL-MCNC: 120 MG/DL (ref ?–130)
OSMOLALITY SERPL CALC.SUM OF ELEC: 294 MOSM/KG (ref 275–295)
PH UR STRIP.AUTO: 6.5 [PH] (ref 5–8)
PLATELET # BLD AUTO: 232 10(3)UL (ref 150–450)
POTASSIUM SERPL-SCNC: 4.1 MMOL/L (ref 3.5–5.1)
PROT SERPL-MCNC: 7.4 G/DL (ref 6.4–8.2)
PROT UR STRIP.AUTO-MCNC: NEGATIVE MG/DL
RBC # BLD AUTO: 5.02 X10(6)UL
RBC UR QL AUTO: NEGATIVE
SODIUM SERPL-SCNC: 141 MMOL/L (ref 136–145)
SP GR UR STRIP.AUTO: 1.02 (ref 1–1.03)
TRIGL SERPL-MCNC: 176 MG/DL (ref 30–149)
TSI SER-ACNC: 1.04 MIU/ML (ref 0.36–3.74)
UROBILINOGEN UR STRIP.AUTO-MCNC: NORMAL MG/DL
VLDLC SERPL CALC-MCNC: 29 MG/DL (ref 0–30)
WBC # BLD AUTO: 6.3 X10(3) UL (ref 4–11)

## 2024-06-19 PROCEDURE — 84443 ASSAY THYROID STIM HORMONE: CPT

## 2024-06-19 PROCEDURE — 80053 COMPREHEN METABOLIC PANEL: CPT

## 2024-06-19 PROCEDURE — 83036 HEMOGLOBIN GLYCOSYLATED A1C: CPT

## 2024-06-19 PROCEDURE — 81003 URINALYSIS AUTO W/O SCOPE: CPT

## 2024-06-19 PROCEDURE — 80061 LIPID PANEL: CPT

## 2024-06-19 PROCEDURE — 36415 COLL VENOUS BLD VENIPUNCTURE: CPT

## 2024-06-19 PROCEDURE — 85025 COMPLETE CBC W/AUTO DIFF WBC: CPT

## 2024-07-01 ENCOUNTER — OFFICE VISIT (OUTPATIENT)
Dept: FAMILY MEDICINE CLINIC | Facility: CLINIC | Age: 44
End: 2024-07-01
Payer: COMMERCIAL

## 2024-07-01 VITALS
DIASTOLIC BLOOD PRESSURE: 83 MMHG | WEIGHT: 209 LBS | HEART RATE: 56 BPM | BODY MASS INDEX: 31.67 KG/M2 | RESPIRATION RATE: 16 BRPM | HEIGHT: 68 IN | SYSTOLIC BLOOD PRESSURE: 125 MMHG | TEMPERATURE: 98 F

## 2024-07-01 DIAGNOSIS — E78.00 HYPERCHOLESTEREMIA: ICD-10-CM

## 2024-07-01 DIAGNOSIS — R73.9 HYPERGLYCEMIA: ICD-10-CM

## 2024-07-01 DIAGNOSIS — Z00.00 PHYSICAL EXAM, ANNUAL: Primary | ICD-10-CM

## 2024-07-01 NOTE — PROGRESS NOTES
Chris An is a 43 year old male who presents for a complete physical exam.   HPI:   Pt has no complaints.  Taking cholesterol medication seeing cardiologist  Dr. Dasilva who speaks Polish.    Immunization History   Administered Date(s) Administered    TDAP 07/05/2016     Wt Readings from Last 6 Encounters:   07/01/24 209 lb (94.8 kg)   06/12/24 208 lb 12.8 oz (94.7 kg)   05/29/24 208 lb (94.3 kg)   02/07/24 213 lb (96.6 kg)   04/19/23 205 lb 6.4 oz (93.2 kg)   01/23/23 215 lb (97.5 kg)     Body mass index is 31.78 kg/m².     Cholesterol, Total (mg/dL)   Date Value   06/19/2024 163   04/07/2023 136   01/25/2023 293 (H)     HDL Cholesterol (mg/dL)   Date Value   06/19/2024 43   04/07/2023 35 (L)   01/25/2023 40     LDL Cholesterol (mg/dL)   Date Value   06/19/2024 90   04/07/2023 82   01/25/2023 201 (H)     AST (U/L)   Date Value   06/19/2024 20   11/07/2023 23   01/25/2023 24     ALT (U/L)   Date Value   06/19/2024 41   11/07/2023 50   01/25/2023 48      No results found for: \"PSA\"  No results found for: \"GLUCOSE\"    Current Outpatient Medications   Medication Sig Dispense Refill    atorvastatin 20 MG Oral Tab         Past Medical History:    Abdominal pain    Bad breath    Bleeding nose    Bloating    Decorative tattoo    Heartburn    High cholesterol    Problems with swallowing    Uncomfortable fullness after meals      Past Surgical History:   Procedure Laterality Date    Appendectomy  01/01/1992    Lysis of adhesions  2012    Other surgical history  01/01/2012    lipomas      Family History   Problem Relation Age of Onset    Other (gout) Father     Other (boreliosis) Mother     Heart Disorder Paternal Grandfather     Diabetes Paternal Grandfather     Stroke Paternal Grandfather       Social History:  Social History     Socioeconomic History    Marital status:    Tobacco Use    Smoking status: Former     Current packs/day: 0.00     Types: Cigarettes     Quit date: 11/20/2015     Years since quitting:  8.6    Smokeless tobacco: Former   Vaping Use    Vaping status: Never Used   Substance and Sexual Activity    Alcohol use: Yes     Alcohol/week: 4.0 standard drinks of alcohol     Types: 1 Glasses of wine, 1 Cans of beer, 1 Shots of liquor, 1 Standard drinks or equivalent per week     Comment: WEEKENDS    Drug use: No    Sexual activity: Yes     Partners: Female   Other Topics Concern    Caffeine Concern No    Exercise Yes    Seat Belt Yes    Special Diet No    Stress Concern No    Weight Concern Yes      Occ production. : yes  Children: 2  Exercise: three times per week.  Diet: watches calories closely     REVIEW OF SYSTEMS:   GENERAL: feels well otherwise  SKIN: denies any unusual skin lesions  EYES:denies blurred vision or double vision  HEENT: denies nasal congestion, sinus pain or ST  LUNGS: denies shortness of breath with exertion  CARDIOVASCULAR: denies chest pain on exertion  GI: denies abdominal pain,denies heartburn  : denies nocturia or changes in stream  MUSCULOSKELETAL: denies back pain  NEURO: denies headaches  PSYCHE: denies depression or anxiety  HEMATOLOGIC: denies hx of anemia  ENDOCRINE: denies thyroid history  ALL/ASTHMA: denies hx of allergy or asthma    EXAM:   /83 (BP Location: Right arm, Patient Position: Sitting, Cuff Size: adult)   Pulse 56   Temp 98.4 °F (36.9 °C) (Temporal)   Resp 16   Ht 5' 8\" (1.727 m)   Wt 209 lb (94.8 kg)   BMI 31.78 kg/m²   Body mass index is 31.78 kg/m².   GENERAL: well developed, well nourished,in no apparent distress  SKIN: no rashes,no suspicious lesions  HEENT: atraumatic, normocephalic,ears and throat are clear  EYES:PERRLA, EOMI, conjunctiva are clear  NECK: supple,no adenopathy,  CHEST: no chest tenderness  BREAST: no dominant or suspicious mass  LUNGS: clear to auscultation  CARDIO: RRR without murmur  GI: good BS's,no masses, HSM or tenderness  : two descended testes,no masses,no hernia,no penile lesions  RECTAL: good rectal tone,  prostate shows no masses,   MUSCULOSKELETAL: back is not tender,FROM of the back  EXTREMITIES: no cyanosis, clubbing or edema  NEURO: Oriented times three,cranial nerves are intact,motor and sensory are grossly intact  Results for orders placed or performed in visit on 06/19/24   Lipid Panel [E]   Result Value Ref Range    Cholesterol, Total 163 <200 mg/dL    HDL Cholesterol 43 40 - 59 mg/dL    Triglycerides 176 (H) 30 - 149 mg/dL    LDL Cholesterol 90 <100 mg/dL    VLDL 29 0 - 30 mg/dL    Non HDL Chol 120 <130 mg/dL    Patient Fasting for Lipid? Yes    Comp Metabolic Panel (14) [E]   Result Value Ref Range    Glucose 103 (H) 70 - 99 mg/dL    Sodium 141 136 - 145 mmol/L    Potassium 4.1 3.5 - 5.1 mmol/L    Chloride 110 98 - 112 mmol/L    CO2 27.0 21.0 - 32.0 mmol/L    Anion Gap 4 0 - 18 mmol/L    BUN 17 9 - 23 mg/dL    Creatinine 1.00 0.70 - 1.30 mg/dL    Calcium, Total 9.0 8.5 - 10.1 mg/dL    Calculated Osmolality 294 275 - 295 mOsm/kg    eGFR-Cr 96 >=60 mL/min/1.73m2    AST 20 15 - 37 U/L    ALT 41 16 - 61 U/L    Alkaline Phosphatase 65 45 - 117 U/L    Bilirubin, Total 0.4 0.1 - 2.0 mg/dL    Total Protein 7.4 6.4 - 8.2 g/dL    Albumin 3.9 3.4 - 5.0 g/dL    Globulin  3.5 2.8 - 4.4 g/dL    A/G Ratio 1.1 1.0 - 2.0    Patient Fasting for CMP? Yes    TSH W Reflex To Free T4 [E]   Result Value Ref Range    TSH 1.040 0.358 - 3.740 mIU/mL   PSA, Total (Screening) [E]   Result Value Ref Range    Prostate Specific Antigen Screen 0.53 <=4.00 ng/mL   UA/M With Culture Reflex [E]    Specimen: Urine, clean catch   Result Value Ref Range    Urine Color Yellow Yellow    Clarity Urine Clear Clear    Spec Gravity 1.025 1.005 - 1.030    Glucose Urine Normal Normal mg/dL    Bilirubin Urine Negative Negative    Ketones Urine Negative Negative mg/dL    Blood Urine Negative Negative    pH Urine 6.5 5.0 - 8.0    Protein Urine Negative Negative mg/dL    Urobilinogen Urine Normal Normal mg/dL    Nitrite Urine Negative Negative    Leukocyte  Esterase Urine Negative Negative    Microscopic Microscopic not indicated    Hemoglobin A1C [E]   Result Value Ref Range    HgbA1C 5.6 <5.7 %    Estimated Average Glucose 114 68 - 126 mg/dL   CBC W/ DIFFERENTIAL   Result Value Ref Range    WBC 6.3 4.0 - 11.0 x10(3) uL    RBC 5.02 4.30 - 5.70 x10(6)uL    HGB 14.3 13.0 - 17.5 g/dL    HCT 42.9 39.0 - 53.0 %    .0 150.0 - 450.0 10(3)uL    MCV 85.5 80.0 - 100.0 fL    MCH 28.5 26.0 - 34.0 pg    MCHC 33.3 31.0 - 37.0 g/dL    RDW 12.9 %    Neutrophil Absolute Prelim 2.81 1.50 - 7.70 x10 (3) uL    Neutrophil Absolute 2.81 1.50 - 7.70 x10(3) uL    Lymphocyte Absolute 2.65 1.00 - 4.00 x10(3) uL    Monocyte Absolute 0.57 0.10 - 1.00 x10(3) uL    Eosinophil Absolute 0.26 0.00 - 0.70 x10(3) uL    Basophil Absolute 0.03 0.00 - 0.20 x10(3) uL    Immature Granulocyte Absolute 0.01 0.00 - 1.00 x10(3) uL    Neutrophil % 44.3 %    Lymphocyte % 41.9 %    Monocyte % 9.0 %    Eosinophil % 4.1 %    Basophil % 0.5 %    Immature Granulocyte % 0.2 %     ASSESSMENT AND PLAN:   Chris An is a 43 year old male who presents for a complete physical exam.   Encounter Diagnoses   Name Primary?    Physical exam, annual Yes    Hypercholesteremia     Hyperglycemia        No orders of the defined types were placed in this encounter.      Meds & Refills for this Visit:  Requested Prescriptions      No prescriptions requested or ordered in this encounter     Healthy diet.  Stay active.      Imaging & Consults:  None      Pt's weight is Body mass index is 31.78 kg/m²., recommended low carb diet and aerobic exercise 30 minutes three times weekly. Health maintenance, will check fasting Lipids, CMP, CBC and PSA. Pt will be at 45  referred for screening colonoscopy. The patient indicates understanding of these issues and agrees to the plan.  The patient is asked to return for CPX in 1 year.  Follow-up sooner as needed.

## 2024-07-29 PROBLEM — R13.14 DYSPHAGIA, PHARYNGOESOPHAGEAL PHASE: Status: ACTIVE | Noted: 2024-07-29

## 2024-07-29 PROBLEM — R13.10 DYSPHAGIA, UNSPECIFIED: Status: ACTIVE | Noted: 2024-07-29

## 2024-08-27 ENCOUNTER — MED REC SCAN ONLY (OUTPATIENT)
Dept: FAMILY MEDICINE CLINIC | Facility: CLINIC | Age: 44
End: 2024-08-27

## 2025-08-19 ENCOUNTER — MED REC SCAN ONLY (OUTPATIENT)
Dept: FAMILY MEDICINE CLINIC | Facility: CLINIC | Age: 45
End: 2025-08-19

## (undated) NOTE — ED AVS SNAPSHOT
Gama Agee   MRN: [id number]    Department:  [facility] in [place]   Date of Visit:  1/2/2020           Disclosure     Insurance plans vary and the physician(s) referred by the ER may not be covered by your plan.  Please contact tell this physician (or your personal doctor if your instructions are to return to your personal doctor) about any new or lasting problems. The primary care or specialist physician will see patients referred from the [facility] Emergency Department.  Domingo Felix

## (undated) NOTE — MR AVS SNAPSHOT
[facility], [address] [id number] [phone]               Thank you for choosing us for your health care visit with Spring Robertson MD.  We are glad to serve you and happy to provide you with this dixon These medications were sent to [address] [place] [address]  [address], [address] [facility]  [address] [phone], [phone] [address], [address] [facility]     Phone:  [phone]    - Diclofenac Sodium 75 MG Tbec            MyChart     Visit MyChart  You Get your heart pumping – brisk walking, biking, swimming Even 10 minute increments are effective and add up over the week   2 ½ hours per week – spread out over time Use a sindhu to keep you motivated   Don’t forget strength training with weights and resist